# Patient Record
Sex: FEMALE | Race: WHITE | Employment: FULL TIME | URBAN - METROPOLITAN AREA
[De-identification: names, ages, dates, MRNs, and addresses within clinical notes are randomized per-mention and may not be internally consistent; named-entity substitution may affect disease eponyms.]

---

## 2020-12-16 PROBLEM — R60.0 BILATERAL EDEMA OF LOWER EXTREMITY: Status: ACTIVE | Noted: 2020-09-29

## 2020-12-16 PROBLEM — R91.8 PULMONARY NODULES: Status: ACTIVE | Noted: 2020-09-29

## 2020-12-16 PROBLEM — R09.02 HYPOXIA: Status: ACTIVE | Noted: 2020-12-16

## 2020-12-16 PROBLEM — F32.A DEPRESSION: Status: ACTIVE | Noted: 2020-12-16

## 2020-12-16 PROBLEM — N17.9 ACUTE KIDNEY INJURY (HCC): Status: ACTIVE | Noted: 2020-12-16

## 2020-12-16 PROBLEM — E66.01 OBESITY, MORBID (HCC): Status: ACTIVE | Noted: 2020-12-16

## 2020-12-16 PROBLEM — I50.31 ACUTE HEART FAILURE WITH PRESERVED EJECTION FRACTION (HCC): Status: ACTIVE | Noted: 2020-10-01

## 2020-12-16 PROBLEM — Z86.69 HISTORY OF SLEEP APNEA: Status: ACTIVE | Noted: 2020-12-16

## 2020-12-16 PROBLEM — R06.09 DOE (DYSPNEA ON EXERTION): Status: ACTIVE | Noted: 2020-09-29

## 2020-12-16 PROBLEM — S22.000A CLOSED COMPRESSION FRACTURE OF THORACIC VERTEBRA (HCC): Status: ACTIVE | Noted: 2018-05-10

## 2020-12-16 PROBLEM — J90 PLEURAL EFFUSION, BILATERAL: Status: ACTIVE | Noted: 2020-09-29

## 2021-02-26 ENCOUNTER — HOSPITAL ENCOUNTER (OUTPATIENT)
Dept: CT IMAGING | Age: 61
Discharge: HOME | End: 2021-02-26
Attending: NURSE PRACTITIONER
Payer: COMMERCIAL

## 2021-02-26 DIAGNOSIS — J90 PLEURAL EFFUSION, BILATERAL: ICD-10-CM

## 2021-02-26 DIAGNOSIS — R91.8 PULMONARY NODULES: ICD-10-CM

## 2021-02-26 DIAGNOSIS — R06.09 DOE (DYSPNEA ON EXERTION): ICD-10-CM

## 2021-02-26 PROCEDURE — 71250 CT THORAX DX C-: CPT

## 2021-03-04 PROBLEM — J90 PLEURAL EFFUSION, BILATERAL: Status: RESOLVED | Noted: 2020-09-29 | Resolved: 2021-03-04

## 2021-03-04 PROBLEM — R09.02 HYPOXIA: Status: RESOLVED | Noted: 2020-12-16 | Resolved: 2021-03-04

## 2021-03-16 PROBLEM — Z99.89 OSA ON CPAP: Status: ACTIVE | Noted: 2021-03-16

## 2021-03-16 PROBLEM — G47.33 OSA ON CPAP: Status: ACTIVE | Noted: 2021-03-16

## 2021-04-09 ENCOUNTER — HOSPITAL ENCOUNTER (OUTPATIENT)
Age: 61
Setting detail: OUTPATIENT SURGERY
Discharge: HOME | End: 2021-04-09
Attending: INTERNAL MEDICINE | Admitting: INTERNAL MEDICINE
Payer: COMMERCIAL

## 2021-04-09 ENCOUNTER — ANESTHESIA EVENT (OUTPATIENT)
Dept: ENDOSCOPY | Age: 61
End: 2021-04-09
Payer: COMMERCIAL

## 2021-04-09 ENCOUNTER — ANESTHESIA (OUTPATIENT)
Dept: ENDOSCOPY | Age: 61
End: 2021-04-09
Payer: COMMERCIAL

## 2021-04-09 VITALS
WEIGHT: 283 LBS | RESPIRATION RATE: 14 BRPM | HEART RATE: 70 BPM | TEMPERATURE: 98.6 F | HEIGHT: 64 IN | DIASTOLIC BLOOD PRESSURE: 66 MMHG | SYSTOLIC BLOOD PRESSURE: 122 MMHG | OXYGEN SATURATION: 97 % | BODY MASS INDEX: 48.32 KG/M2

## 2021-04-09 LAB
GLUCOSE BLD STRIP.AUTO-MCNC: 118 MG/DL (ref 65–100)
SERVICE CMNT-IMP: ABNORMAL

## 2021-04-09 PROCEDURE — 74011250636 HC RX REV CODE- 250/636: Performed by: ANESTHESIOLOGY

## 2021-04-09 PROCEDURE — 74011000250 HC RX REV CODE- 250: Performed by: NURSE ANESTHETIST, CERTIFIED REGISTERED

## 2021-04-09 PROCEDURE — 82962 GLUCOSE BLOOD TEST: CPT

## 2021-04-09 PROCEDURE — 76060000032 HC ANESTHESIA 0.5 TO 1 HR: Performed by: INTERNAL MEDICINE

## 2021-04-09 PROCEDURE — 76040000025: Performed by: INTERNAL MEDICINE

## 2021-04-09 PROCEDURE — 2709999900 HC NON-CHARGEABLE SUPPLY: Performed by: INTERNAL MEDICINE

## 2021-04-09 PROCEDURE — 74011250636 HC RX REV CODE- 250/636: Performed by: NURSE ANESTHETIST, CERTIFIED REGISTERED

## 2021-04-09 RX ORDER — SODIUM CHLORIDE, SODIUM LACTATE, POTASSIUM CHLORIDE, CALCIUM CHLORIDE 600; 310; 30; 20 MG/100ML; MG/100ML; MG/100ML; MG/100ML
100 INJECTION, SOLUTION INTRAVENOUS CONTINUOUS
Status: DISCONTINUED | OUTPATIENT
Start: 2021-04-09 | End: 2021-04-09 | Stop reason: HOSPADM

## 2021-04-09 RX ORDER — PROPOFOL 10 MG/ML
INJECTION, EMULSION INTRAVENOUS
Status: DISCONTINUED | OUTPATIENT
Start: 2021-04-09 | End: 2021-04-09 | Stop reason: HOSPADM

## 2021-04-09 RX ORDER — PROPOFOL 10 MG/ML
INJECTION, EMULSION INTRAVENOUS AS NEEDED
Status: DISCONTINUED | OUTPATIENT
Start: 2021-04-09 | End: 2021-04-09 | Stop reason: HOSPADM

## 2021-04-09 RX ORDER — LIDOCAINE HYDROCHLORIDE 20 MG/ML
INJECTION, SOLUTION EPIDURAL; INFILTRATION; INTRACAUDAL; PERINEURAL AS NEEDED
Status: DISCONTINUED | OUTPATIENT
Start: 2021-04-09 | End: 2021-04-09 | Stop reason: HOSPADM

## 2021-04-09 RX ADMIN — PROPOFOL 20 MG: 10 INJECTION, EMULSION INTRAVENOUS at 10:52

## 2021-04-09 RX ADMIN — SODIUM CHLORIDE, SODIUM LACTATE, POTASSIUM CHLORIDE, AND CALCIUM CHLORIDE 100 ML/HR: 600; 310; 30; 20 INJECTION, SOLUTION INTRAVENOUS at 08:49

## 2021-04-09 RX ADMIN — LIDOCAINE HYDROCHLORIDE 100 MG: 20 INJECTION, SOLUTION EPIDURAL; INFILTRATION; INTRACAUDAL; PERINEURAL at 10:50

## 2021-04-09 RX ADMIN — PROPOFOL 200 MCG/KG/MIN: 10 INJECTION, EMULSION INTRAVENOUS at 10:50

## 2021-04-09 RX ADMIN — PROPOFOL 60 MG: 10 INJECTION, EMULSION INTRAVENOUS at 10:50

## 2021-04-09 NOTE — ANESTHESIA PREPROCEDURE EVALUATION
Relevant Problems   No relevant active problems       Anesthetic History   No history of anesthetic complications            Review of Systems / Medical History  Patient summary reviewed and pertinent labs reviewed    Pulmonary        Sleep apnea: CPAP           Neuro/Psych         Psychiatric history    Comments: Neuropathy?  Cardiovascular    Hypertension          Hyperlipidemia    Exercise tolerance[de-identified] Activity limited by weight but she reports she remains active and denied chest pain, SOB, syncope   Comments: Chronic diastolic dysfunction     Nuclear stress 1/2021 - negative ischemia, normal LV function    GI/Hepatic/Renal     GERD      Liver disease (TAVERAS/fatty liver)     Endo/Other    Diabetes: well controlled  Hypothyroidism: well controlled  Morbid obesity and arthritis     Other Findings              Physical Exam    Airway  Mallampati: II  TM Distance: 4 - 6 cm  Neck ROM: normal range of motion   Mouth opening: Normal     Cardiovascular    Rhythm: regular  Rate: normal      Pertinent negatives: No JVD and peripheral edema   Dental    Dentition: Poor dentition, Lower partial plate and Upper partial plate     Pulmonary                Comments: Distant but no appreciable wheezing or rales Abdominal         Other Findings            Anesthetic Plan    ASA: 3  Anesthesia type: total IV anesthesia          Induction: Intravenous  Anesthetic plan and risks discussed with: Patient and Spouse

## 2021-04-09 NOTE — DISCHARGE INSTRUCTIONS
Gastrointestinal Esophagogastroduodenoscopy (EGD) - Upper Exam Discharge Instructions    1. Call Dr. Ora Saleh at [phone] for any problems or questions. 2. Contact the doctor's office for follow up appointment as directed. 3. Medication may cause drowsiness for several hours, therefore:  · Do not drive or operate machinery for remainder of the day. · No alcohol today. · Do not make any important or legal decisions for 24 hours. · Do not sign any legal documents for 24 hours. 5. Ordinarily, you may resume regular diet and activity after exam unless otherwise specified by your physician. 6. For mild soreness in your throat you may use Cepacol throat lozenges or warm  salt-water gargles as needed. Any additional instructions:  Soft diet today and increase as tolerated, continue pantoprozole,      Instructions given to Danuta Warren and other family members. Instructions given by:      Gastrointestinal Colonoscopy/Flexible Sigmoidoscopy - Lower Exam Discharge Instructions  1. Call Dr. palma  for any problems or questions. 2. Contact the doctors office for follow up appointment as directed  3. Medication may cause drowsiness for several hours, therefore:  · Do not drive or operate machinery for reminder of the day. · No alcohol today. · Do not make any important or legal decisions for 24 hours. · Do not sign any legal documents for 24 hours. 4. Ordinarily, you may resume regular diet and activity after exam unless otherwise specified by your physician. 5. Because of air put into your colon during exam, you may experience some abdominal distension, relieved by the passage of gas, for several hours. 6. Contact your physician if you have any of the following:  · Excessive amount of bleeding - large amount when having a bowel movement. Occasional specks of blood with bowel movement would not be unusual.  · Severe abdominal pain  · Fever or Chills  7.  Polyp Removal - follow these additional instructions  · No Aspirin, Advil, Aleve, Nuprin, Ibuprofen, or medications that contain these drugs for 2 weeks. Any additional instructions:  Repeat colon in 3 years, high fiber diet. Instructions given to Sadia Becerril and other family members.   Instructions given by:

## 2021-04-09 NOTE — H&P
Gastroenterology Outpatient History and Physical    Patient: Yuli Gustafson    Physician: Tunde Granger MD    Vital Signs: Blood pressure (!) 165/77, pulse 89, temperature 97.9 °F (36.6 °C), resp. rate 16, height 5' 3.5\" (1.613 m), weight 128.4 kg (283 lb), SpO2 100 %. Allergies: Allergies   Allergen Reactions    Dapagliflozin Other (comments)     Yeast infection    Erythromycin Nausea and Vomiting     Severe GI upset    Lisinopril Other (comments)     Heaviness in extremities, cough      Statins-Hmg-Coa Reductase Inhibitors Other (comments)     Heaviness in extremities       Chief Complaint: Dysphagia, history of esophageal stricture, gastroesophageal reflux disease, sauer with fibrosis, history of colon polyps due for followup. History of Present Illness: No changes since office visit of 2/16/21. Justification for Procedure: R/o inflammatory, neoplastic or other pathology in UGI and LGI tract.     History:  Past Medical History:   Diagnosis Date    SALINA [facility]acute kidney injury) ([facility] [address] 11/2020    Arthritis     back    Congestive heart failure ([facility] [address] 09/2020    Diabetes mellitus (HCC)     insulin, -110, S&s hypoglycemia BS 60's, 10/2020 A1c 7.6    GERD (gastroesophageal reflux disease)     medication    Hashimoto's disease     Hypertension     controlled with meds    Liver disease     SAUER (nonalcoholic steatohepatitis)     Psychiatric disorder     anxiety and depression    Sleep apnea 02/2021    cpap at     Thyroid disease       Past Surgical History:   Procedure Laterality Date    HX CHOLECYSTECTOMY      HX HYSTERECTOMY      HX ORTHOPAEDIC Right 2017    x 4 surgeries on ankles    HX ORTHOPAEDIC Left 2020    x 2 surgeries on ankles    HX REFRACTIVE SURGERY      HX TONSILLECTOMY        Social History     Socioeconomic History    Marital status:      Spouse name: Not on file    Number of children: Not on file    Years of education: Not on file    Highest education level: Not on file   Tobacco Use    Smoking status: Never Smoker    Smokeless tobacco: Never Used   Substance and Sexual Activity    Alcohol use: Not Currently    Drug use: Not Currently      Family History   Problem Relation Age of Onset   [facility] Hypertension Mother     Other Mother         ALS    COPD Father     Heart Disease Father     Asthma Sister     Cancer Sister     Diabetes Sister        Medications:   Prior to Admission medications    Medication Sig Start Date End Date Taking? Authorizing Provider   insulin glargine (LANTUS,BASAGLAR) 100 unit/mL (3 mL) inpn by SubCUTAneous route two (2) times a day. Divided dose 55 am  And 60 at pm   Yes Provider, Historical   pantoprazole (PROTONIX) 40 mg tablet Take 40 mg by mouth daily. Yes Provider, Historical   amLODIPine (NORVASC) 10 mg tablet Take  by mouth every evening. Provider, Historical   ergocalciferol (ERGOCALCIFEROL) 1,250 mcg (50,000 unit) capsule Take 50,000 Units by mouth every Tuesday and Friday. Tuesday and Friday    Provider, Historical   diclofenac EC (VOLTAREN) 50 mg EC tablet Take  by mouth two (2) times a day. Provider, Historical   cyclobenzaprine (FLEXERIL) 10 mg tablet Take  by mouth three (3) times daily as needed for Muscle Spasm(s). Provider, Historical   acetaminophen (TylenoL) 325 mg tablet Take 650 mg by mouth every four (4) hours as needed for Pain. Provider, Historical   losartan (COZAAR) 25 mg tablet Take 25 mg by mouth daily. Provider, Historical   furosemide (Lasix) 40 mg tablet Take 40 mg by mouth daily. Takes BID if >2 lb wt gain    Provider, Historical   aspirin 81 mg chewable tablet Take 81 mg by mouth daily. preventative    Provider, Historical   insulin aspart U-100 (NOVOLOG) 100 unit/mL (3 mL) inpn 1 Units by SubCUTAneous route Before breakfast, lunch, and dinner. Sliding scale dosing:     Provider, Historical   levothyroxine (SYNTHROID) 75 mcg tablet Take  by mouth Daily (before breakfast). Provider, Historical   albuterol (PROVENTIL HFA, VENTOLIN HFA, PROAIR HFA) 90 mcg/actuation inhaler Take 2 Puffs by inhalation every four (4) hours as needed for Wheezing. 3/4/21   Mary Mccarty MD   spironolactone (ALDACTONE) 25 mg tablet Take  by mouth daily. Provider, Historical   gabapentin (NEURONTIN) 300 mg capsule Take 300 mg by mouth nightly. And prn up to TID    Provider, Historical   HYDROcodone-acetaminophen (NORCO) 5-325 mg per tablet Take  by mouth every six (6) hours as needed. Provider, Historical   dicyclomine (BENTYL) 10 mg capsule Take 10 mg by mouth as needed. Provider, Historical   ondansetron hcl (Zofran) 8 mg tablet Take 8 mg by mouth every eight (8) hours as needed for Nausea or Vomiting. Provider, Historical   plecanatide (Trulance) 3 mg tab Take 0.75-3 mg by mouth daily as needed. Provider, Historical       Physical Exam:   General: alert, cooperative, no distress   HEENT: Head: Normocephalic, no lesions, without obvious abnormality. Heart: regular rate and rhythm, S1, S2 normal, no murmur, click, rub or gallop   Lungs: chest clear, no wheezing, rales, normal symmetric air entry   Abdominal: Bowel sounds are normal, liver is not enlarged, spleen is not enlarged   Neurological: Grossly normal   Extremities: no edema     Findings/Diagnosis:  Dysphagia, history of esophageal stricture, gastroesophageal reflux disease, sauer with fibrosis, history of colon polyps due for followup    Plan of Care/Planned Procedure: EGD, Dilation and Colonoscopy.     Leora Costello MD

## 2021-04-10 NOTE — PROCEDURES
Operative Note    Patient: Bridgette Montes De Oca  YOB: 1960  MRN: [id number]    Date of Procedure: 4/9/2021     Pre-Op Diagnosis: GERD without esophagitis [K21.9]  Dysphagia, unspecified type [R13.10]  History of esophageal stricture   Hepatic fibrosis [K74.00]  TAVERAS (nonalcoholic steatohepatitis) [K75.81]  Colon screening  Family history of colon cancer [Z80.0]  Hx of colonic polyps [Z86.010]  Chronic idiopathic constipation [K59.04]    Post-Op Diagnosis: Esophageal stricture at EG junction which was dilated to 56-[place]. Mild diverticulosis and small internal hemorrhoids. Procedure(s):  ESOPHAGOGASTRODUODENOSCOPY (EGD)  COLONOSCOPY/ 50  ESOPHAGEAL DILATION    Surgeon(s):  Vilma Fuentes MD    Surgical Assistant: None    Anesthesia: MAC     Estimated Blood Loss (mL): Minimal    Complications: None    Specimens: * No specimens in log *     Implants: * No implants in log *    Procedure #1:  After obtaining informed consent, the patient was placed in the left lateral position and received IV anesthesia. See anesthesia records for details. The endoscope was advanced under direct vision without difficulty. The esophagus, stomach (including retroflexed views) and duodenum were evaluated. The patient was then repositioned for the Colonoscopy. COMMENTS:  54 and 56 Fr Power dilators were passed sequentially in the usual fashion without difficulty after the initial endoscopy. Mild resistance and small amount of blood was noted on the larger dilator. Repeat endoscopy after dilation revealed minimal trauma with the smaller dilator and mild superficial tear indicating successful dilation with the larger dilator.  There was mild bleeding with spontaneous hemostasis confirmed before withdrawal.     Findings:  OROPHARYNX:  Cords and Pyriform recesses appeared normal.  ESOPHAGUS:  The proximal, mid and distal esophagus appeared normal. The Z line was intact without evidence of esophagitis, ulcerations or Encinas's at 38 cms. A nonobstructing ring like narrowing at the EG junction was noted which was dilated as described above. STOMACH:  The fundus on antegrade and retroflexed views was normal. The body, antrum and pylorus also appeared normal.  DUODENUM:  The bulb and second portions appeared normal.    Recommendations:   Routine post endoscopy + dilation instructions. Soft diet today - to be advanced as tolerated. Continue pantoprazole and other antireflux measures. Consider larger bougie dilation to 60 Fr if symptoms persist or recur early. Also see colonoscopy report. Procedure #2:  After informed consent for both procedures, the patient was placed in the left lateral position and received iv anesthesia. See anesthesiology records for details. EGD was initially performed as described. Patient was re-positioned for Colonoscopy. A digital rectal exam was performed. The Colonoscope was inserted into the rectum and passed under direct vison to the cecum where the ileocecal valve and appendiceal orifice were identified. Colonoscope was then slowly withdrawn with careful evaluation of the mucosa. Retroflexion was performed in the rectum. Prep was fair / good. Visualization was adequate. The patient was taken to the recovery area in stable condition. Findings:  RECTUM: Small internal hemorrhoids were noted. COLON: Mild diverticulosis was noted in the Sigmoid and Descending Colon. Remainder of the Colon exam was within normal limits. TERMINAL ILEUM: The terminal ileum was not entered. Recommendations:   Routine post colonoscopy instructions. High fiber diet. Conservative management of diverticulosis and hemorrhoids. Surveillance colonoscopy based on pathology of polyps removed or in 3 years secondary to prior history with removal of multiple high-risk adenomas in 2019. Also see EGD report.    Findings and recommendations were reviewed with patient and attendant in recovery area after the procedure.        Electronically Signed by Bravo Obando MD on 4/9/2021 at 10:41 PM

## 2021-05-11 ENCOUNTER — HOSPITAL ENCOUNTER (OUTPATIENT)
Dept: CT IMAGING | Age: 61
Discharge: HOME | End: 2021-05-11
Attending: STUDENT IN AN ORGANIZED HEALTH CARE EDUCATION/TRAINING PROGRAM
Payer: COMMERCIAL

## 2021-05-11 DIAGNOSIS — J32.9 CHRONIC SINUSITIS, UNSPECIFIED LOCATION: ICD-10-CM

## 2021-05-11 PROCEDURE — 70486 CT MAXILLOFACIAL W/O DYE: CPT

## 2021-06-25 PROBLEM — R91.8 PULMONARY NODULES: Status: RESOLVED | Noted: 2020-09-29 | Resolved: 2021-06-25

## 2021-09-16 PROBLEM — Z78.9 DIFFICULTY WITH CPAP NASAL MASK USE: Status: ACTIVE | Noted: 2021-09-16

## 2021-09-16 PROBLEM — Z86.69 HISTORY OF SLEEP APNEA: Status: RESOLVED | Noted: 2020-12-16 | Resolved: 2021-09-16

## 2022-03-18 PROBLEM — N17.9 ACUTE KIDNEY INJURY (HCC): Status: ACTIVE | Noted: 2020-12-16

## 2022-03-18 PROBLEM — S22.000A CLOSED COMPRESSION FRACTURE OF THORACIC VERTEBRA (HCC): Status: ACTIVE | Noted: 2018-05-10

## 2022-03-19 PROBLEM — G47.33 OSA ON CPAP: Status: ACTIVE | Noted: 2021-03-16

## 2022-03-19 PROBLEM — Z78.9 DIFFICULTY WITH CPAP NASAL MASK USE: Status: ACTIVE | Noted: 2021-09-16

## 2022-03-19 PROBLEM — R60.0 BILATERAL EDEMA OF LOWER EXTREMITY: Status: ACTIVE | Noted: 2020-09-29

## 2022-03-19 PROBLEM — R06.09 DOE (DYSPNEA ON EXERTION): Status: ACTIVE | Noted: 2020-09-29

## 2022-03-19 PROBLEM — I50.31 ACUTE HEART FAILURE WITH PRESERVED EJECTION FRACTION (HCC): Status: ACTIVE | Noted: 2020-10-01

## 2022-03-19 PROBLEM — E66.01 OBESITY, MORBID (HCC): Status: ACTIVE | Noted: 2020-12-16

## 2022-03-19 PROBLEM — Z99.89 OSA ON CPAP: Status: ACTIVE | Noted: 2021-03-16

## 2022-03-20 PROBLEM — F32.A DEPRESSION: Status: ACTIVE | Noted: 2020-12-16

## 2022-05-14 ENCOUNTER — HOSPITAL ENCOUNTER (EMERGENCY)
Age: 62
Discharge: HOME | End: 2022-05-14
Attending: EMERGENCY MEDICINE
Payer: COMMERCIAL

## 2022-05-14 ENCOUNTER — APPOINTMENT (OUTPATIENT)
Dept: CT IMAGING | Age: 62
End: 2022-05-14
Attending: EMERGENCY MEDICINE
Payer: COMMERCIAL

## 2022-05-14 VITALS
TEMPERATURE: 98.7 F | OXYGEN SATURATION: 97 % | RESPIRATION RATE: 17 BRPM | SYSTOLIC BLOOD PRESSURE: 149 MMHG | WEIGHT: 282 LBS | HEIGHT: 64 IN | DIASTOLIC BLOOD PRESSURE: 77 MMHG | HEART RATE: 75 BPM | BODY MASS INDEX: 48.14 KG/M2

## 2022-05-14 DIAGNOSIS — R10.9 ACUTE LEFT FLANK PAIN: Primary | ICD-10-CM

## 2022-05-14 LAB
ALBUMIN SERPL-MCNC: 3.4 G/DL (ref 3.2–4.6)
ALBUMIN/GLOB SERPL: 0.7 {RATIO} (ref 1.2–3.5)
ALP SERPL-CCNC: 73 U/L (ref 50–130)
ALT SERPL-CCNC: 59 U/L (ref 12–65)
ANION GAP SERPL CALC-SCNC: 5 MMOL/L (ref 7–16)
AST SERPL-CCNC: 69 U/L (ref 15–37)
BASOPHILS # BLD: 0.1 K/UL (ref 0–0.2)
BASOPHILS NFR BLD: 1 % (ref 0–2)
BILIRUB SERPL-MCNC: 0.4 MG/DL (ref 0.2–1.1)
BUN SERPL-MCNC: 24 MG/DL (ref 8–23)
CALCIUM SERPL-MCNC: 10.1 MG/DL (ref 8.3–10.4)
CHLORIDE SERPL-SCNC: 104 MMOL/L (ref 98–107)
CO2 SERPL-SCNC: 31 MMOL/L (ref 21–32)
CREAT SERPL-MCNC: 1.44 MG/DL (ref 0.6–1)
DIFFERENTIAL METHOD BLD: NORMAL
EOSINOPHIL # BLD: 0.2 K/UL (ref 0–0.8)
EOSINOPHIL NFR BLD: 2 % (ref 0.5–7.8)
ERYTHROCYTE [DISTWIDTH] IN BLOOD BY AUTOMATED COUNT: 13.7 % (ref 11.9–14.6)
GLOBULIN SER CALC-MCNC: 5.1 G/DL (ref 2.3–3.5)
GLUCOSE SERPL-MCNC: 141 MG/DL (ref 65–100)
HCT VFR BLD AUTO: 44.4 % (ref 35.8–46.3)
HGB BLD-MCNC: 14.2 G/DL (ref 11.7–15.4)
IMM GRANULOCYTES # BLD AUTO: 0 K/UL (ref 0–0.5)
IMM GRANULOCYTES NFR BLD AUTO: 0 % (ref 0–5)
LYMPHOCYTES # BLD: 2.5 K/UL (ref 0.5–4.6)
LYMPHOCYTES NFR BLD: 34 % (ref 13–44)
MCH RBC QN AUTO: 29 PG (ref 26.1–32.9)
MCHC RBC AUTO-ENTMCNC: 32 G/DL (ref 31.4–35)
MCV RBC AUTO: 90.8 FL (ref 79.6–97.8)
MONOCYTES # BLD: 0.6 K/UL (ref 0.1–1.3)
MONOCYTES NFR BLD: 8 % (ref 4–12)
NEUTS SEG # BLD: 4.1 K/UL (ref 1.7–8.2)
NEUTS SEG NFR BLD: 55 % (ref 43–78)
NRBC # BLD: 0 K/UL (ref 0–0.2)
PLATELET # BLD AUTO: 206 K/UL (ref 150–450)
PMV BLD AUTO: 11.7 FL (ref 9.4–12.3)
POTASSIUM SERPL-SCNC: 5.7 MMOL/L (ref 3.5–5.1)
PROT SERPL-MCNC: 8.5 G/DL (ref 6.3–8.2)
RBC # BLD AUTO: 4.89 M/UL (ref 4.05–5.2)
SODIUM SERPL-SCNC: 140 MMOL/L (ref 136–145)
WBC # BLD AUTO: 7.4 K/UL (ref 4.3–11.1)

## 2022-05-14 PROCEDURE — 99284 EMERGENCY DEPT VISIT MOD MDM: CPT

## 2022-05-14 PROCEDURE — 81003 URINALYSIS AUTO W/O SCOPE: CPT

## 2022-05-14 PROCEDURE — 74176 CT ABD & PELVIS W/O CONTRAST: CPT

## 2022-05-14 PROCEDURE — 85025 COMPLETE CBC W/AUTO DIFF WBC: CPT

## 2022-05-14 PROCEDURE — 80053 COMPREHEN METABOLIC PANEL: CPT

## 2022-05-14 RX ORDER — TIZANIDINE 2 MG/1
8 TABLET ORAL
Status: DISCONTINUED | OUTPATIENT
Start: 2022-05-14 | End: 2022-05-14 | Stop reason: HOSPADM

## 2022-05-14 RX ORDER — TIZANIDINE 4 MG/1
4-8 TABLET ORAL 3 TIMES DAILY
Qty: 24 TABLET | Refills: 0 | Status: SHIPPED | OUTPATIENT
Start: 2022-05-14

## 2022-05-14 NOTE — ED NOTES
I have reviewed discharge instructions with the patient. The patient verbalized understanding. Patient left ED via Discharge Method: ambulatory to Home with spouse. Opportunity for questions and clarification provided. Patient given 1 scripts. To continue your aftercare when you leave the hospital, you may receive an automated call from our care team to check in on how you are doing. This is a free service and part of our promise to provide the best care and service to meet your aftercare needs.  If you have questions, or wish to unsubscribe from this service please call [phone]. Thank you for Choosing our [facility] Emergency Department.

## 2022-05-14 NOTE — ED PROVIDER NOTES
70-year-old female has a history of diabetes, sleep apnea, perhaps congestive heart failure. She has had some back issues and goes to pain management as well. She has had a 48-hour history of left flank pain which she described as being punched in the back. She has had nausea. Describes as ache. Slightly worse with movement. Slightly worse with breathing but no cough chest pain or shortness of breath. No fever chills. No definite urinary symptoms. No radiation of the pain down her legs or to the abdomen. Remote history of kidney stones. Seen in urgent care and referred here. Review of urgent care records reveals urine without evidence for blood nor infection. She has had a history of a hysterectomy and cholecystectomy as well. Pain is always there for the last 2 days, somewhat waxing and waning. Review of records also reveals some issues with some mildly elevated creatinine in the past.    The history is provided by the patient. Back Pain   This is a new problem. The problem has not changed since onset. The problem occurs constantly. The pain is associated with no known injury. The pain is present in the middle back, lower back and left side. The quality of the pain is described as aching and dull. The pain does not radiate. The pain is moderate. Pertinent negatives include no chest pain, no fever, no abdominal pain, no dysuria, no leg pain, no paresthesias and no weakness. She has tried analgesics for the symptoms. Nausea   Pertinent negatives include no chills, no fever, no abdominal pain and no cough.         Past Medical History:   Diagnosis Date    SALINA (acute kidney injury) ([facility] [address] 11/2020    Arthritis     back    Congestive heart failure ([facility] [address] 09/2020    Diabetes mellitus (HCC)     insulin, -110, S&s hypoglycemia BS 60's, 10/2020 A1c 7.6    GERD (gastroesophageal reflux disease)     medication    Hashimoto's disease     Hypertension     controlled with meds    Liver disease     TAVERAS (nonalcoholic steatohepatitis)     Psychiatric disorder     anxiety and depression    Sleep apnea 02/2021    cpap at hs    Thyroid disease        Past Surgical History:   Procedure Laterality Date    COLONOSCOPY N/A 4/9/2021    COLONOSCOPY/ 48 performed by Jessica Dockery MD at [facility] ENDOSCOPY    HX CHOLECYSTECTOMY      HX HYSTERECTOMY      HX ORTHOPAEDIC Right 2017    x 4 surgeries on ankles    HX ORTHOPAEDIC Left 2020    x 2 surgeries on ankles    HX REFRACTIVE SURGERY      HX TONSILLECTOMY           Family History:   Problem Relation Age of Onset    Hypertension Mother     Other Mother         ALS    COPD Father     Heart Disease Father     Asthma Sister     Cancer Sister     Diabetes Sister        Social History     Socioeconomic History    Marital status:      Spouse name: Not on file    Number of children: Not on file    Years of education: Not on file    Highest education level: Not on file   Occupational History    Not on file   Tobacco Use    Smoking status: Never Smoker    Smokeless tobacco: Never Used   Vaping Use    Vaping Use: Never used   Substance and Sexual Activity    Alcohol use: Not Currently    Drug use: Not Currently    Sexual activity: Not on file   Other Topics Concern    Not on file   Social History Narrative    Not on file     Social Determinants of Health     Financial Resource Strain:     Difficulty of Paying Living Expenses: Not on file   Food Insecurity:     Worried About Running Out of Food in the Last Year: Not on file    Dianelys of Food in the Last Year: Not on file   Transportation Needs:     Lack of Transportation (Medical): Not on file    Lack of Transportation (Non-Medical):  Not on file   Physical Activity:     Days of Exercise per Week: Not on file    Minutes of Exercise per Session: Not on file   Stress:     Feeling of Stress : Not on file   Social Connections:     Frequency of Communication with Friends and Family: Not on file  Frequency of Social Gatherings with Friends and Family: Not on file    Attends [employer] Services: Not on file    Active Member of Clubs or Organizations: Not on file    Attends Club or Organization Meetings: Not on file    Marital Status: Not on file   Intimate Partner Violence:     Fear of Current or Ex-Partner: Not on file    Emotionally Abused: Not on file    Physically Abused: Not on file    Sexually Abused: Not on file   Housing Stability:     Unable to Pay for Housing in the Last Year: Not on file    Number of [place] in the Last Year: Not on file    Unstable Housing in the Last Year: Not on file         ALLERGIES: Adhesive, Statins-hmg-coa reductase inhibitors, Dapagliflozin, Dulaglutide, Erythromycin, Ezetimibe, Liraglutide, Lisinopril, and Metformin hcl    Review of Systems   Constitutional: Negative for chills and fever. Respiratory: Negative for cough and shortness of breath. Cardiovascular: Negative for chest pain. Gastrointestinal: Positive for nausea. Negative for abdominal pain and blood in stool. Genitourinary: Positive for flank pain. Negative for difficulty urinating, dysuria and hematuria. Musculoskeletal: Positive for back pain. Neurological: Negative for weakness and paresthesias. All other systems reviewed and are negative. Vitals:    05/14/22 1352   BP: (!) 149/77   Pulse: 75   Resp: 17   Temp: 98.7 °F (37.1 °C)   SpO2: 97%   Weight: 127.9 kg (282 lb)   Height: 5' 4\" (1.626 m)            Physical Exam  Vitals and nursing note reviewed. Constitutional:       Appearance: She is not ill-appearing. Eyes:      General: No scleral icterus. Conjunctiva/sclera: Conjunctivae normal.   Cardiovascular:      Rate and Rhythm: Normal rate and regular rhythm. Pulmonary:      Effort: Pulmonary effort is normal.      Breath sounds: Normal breath sounds. Abdominal:      Tenderness: There is left CVA tenderness. Comments: No anterior abdominal tenderness. Skin:     General: Skin is warm and dry. Comments: No vesicular rash in back. Some slight discoloration of both flanks. Neurological:      General: No focal deficit present. Mental Status: She is alert. MDM  Number of Diagnoses or Management Options  Diagnosis management comments: Discoloration of the back is chronic according to . Due to heating pad use by the patient. Recheck urinalysis and screening blood work. CT scan to assess for ureteral stone. Possibility of worsening of chronic back pain as well. Doubt any cardiopulmonary issues       Amount and/or Complexity of Data Reviewed  Clinical lab tests: ordered and reviewed  Tests in the radiology section of CPT®: ordered and reviewed    Risk of Complications, Morbidity, and/or Mortality  Presenting problems: moderate  Diagnostic procedures: low  Management options: low    Patient Progress  Patient progress: stable         Procedures      Results Include:    Recent Results (from the past 24 hour(s))   CBC WITH AUTOMATED DIFF    Collection Time: 05/14/22  2:14 PM   Result Value Ref Range    WBC 7.4 4.3 - 11.1 K/uL    RBC 4.89 4.05 - 5.2 M/uL    HGB 14.2 11.7 - 15.4 g/dL    HCT 44.4 35.8 - 46.3 %    MCV 90.8 79.6 - 97.8 FL    MCH 29.0 26.1 - 32.9 PG    MCHC 32.0 31.4 - 35.0 g/dL    RDW 13.7 11.9 - 14.6 %    PLATELET 593 610 - 789 K/uL    MPV 11.7 9.4 - 12.3 FL    ABSOLUTE NRBC 0.00 0.0 - 0.2 K/uL    DF AUTOMATED      NEUTROPHILS 55 43 - 78 %    LYMPHOCYTES 34 13 - 44 %    MONOCYTES 8 4.0 - 12.0 %    EOSINOPHILS 2 0.5 - 7.8 %    BASOPHILS 1 0.0 - 2.0 %    IMMATURE GRANULOCYTES 0 0.0 - 5.0 %    ABS. NEUTROPHILS 4.1 1.7 - 8.2 K/UL    ABS. LYMPHOCYTES 2.5 0.5 - 4.6 K/UL    ABS. MONOCYTES 0.6 0.1 - 1.3 K/UL    ABS. EOSINOPHILS 0.2 0.0 - 0.8 K/UL    ABS. BASOPHILS 0.1 0.0 - 0.2 K/UL    ABS. IMM.  GRANS. 0.0 0.0 - 0.5 K/UL   METABOLIC PANEL, COMPREHENSIVE    Collection Time: 05/14/22  2:14 PM   Result Value Ref Range    Sodium 140 136 - 145 mmol/L    Potassium 5.7 (H) 3.5 - 5.1 mmol/L    Chloride 104 98 - 107 mmol/L    CO2 31 21 - 32 mmol/L    Anion gap 5 (L) 7 - 16 mmol/L    Glucose 141 (H) 65 - 100 mg/dL    BUN 24 (H) 8 - 23 MG/DL    Creatinine 1.44 (H) 0.6 - 1.0 MG/DL    GFR est AA 48 (L) >60 ml/min/1.73m2    GFR est non-AA 39 (L) >60 ml/min/1.73m2    Calcium 10.1 8.3 - 10.4 MG/DL    Bilirubin, total 0.4 0.2 - 1.1 MG/DL    ALT (SGPT) 59 12 - 65 U/L    AST (SGOT) 69 (H) 15 - 37 U/L    Alk. phosphatase 73 50 - 130 U/L    Protein, total 8.5 (H) 6.3 - 8.2 g/dL    Albumin 3.4 3.2 - 4.6 g/dL    Globulin 5.1 (H) 2.3 - 3.5 g/dL    A-G Ratio 0.7 (L) 1.2 - 3.5       .[facility]  CT ABD/PEL FOR RENAL STONE    Result Date: 5/14/2022  CT OF THE ABDOMEN AND PELVIS WITHOUT CONTRAST STONE PROTOCOL INDICATION: Left flank pain. Remote history of kidney stones. COMPARISON: None. TECHNIQUE: Multiple axial images were obtained through the abdomen and pelvis without IV contrast. Radiation dose reduction techniques were used for this study. Our CT scanners use one or all of the following: Automated exposure control, adjustment of the mA and/or kV according to patient size, iterative reconstruction. FINDINGS: Visualized thorax: Normal. Liver: Macronodular appearance of the liver suggesting chronic liver disease. Gallbladder/biliary: Gallbladder surgically absent. No biliary dilatation. Pancreas: Normal. Spleen: Normal. Adrenals: Normal. Kidneys: No radiopaque stone or hydronephrosis. Bladder: Normal. Pelvic organs: Status post hysterectomy. No adnexal mass. Gastrointestinal: Normal. Peritoneum/retroperitoneum: Normal. Lymph nodes: Normal. Vessels: Normal. Bones/Soft tissues: Normal.     1.  No urolithiasis or hydronephrosis. No visualized etiology for flank pain. 2.  Liver morphology suggestive of chronic liver disease. Symptom control and follow-up with primary care doctor and pain management.

## 2022-05-14 NOTE — ED TRIAGE NOTES
Pt c/o left lower back pain that started yesterday along with nausea. Pt states she went to urgent care and was told to come to the ED. Pt states she had a kidney stone when she was 13. Pt denies urinary pain, pt states there is an odor to her urine.

## 2022-05-14 NOTE — DISCHARGE INSTRUCTIONS
Continue rest and heat. Continue current medications. If Zanaflex is helpful, may try that. Consider rechecking with pain management to see if needed any further therapies. Recheck sooner for rash fever or other concerns.

## 2022-06-07 ENCOUNTER — HOSPITAL ENCOUNTER (OUTPATIENT)
Dept: SURGERY | Age: 62
Discharge: HOME | End: 2022-06-10
Payer: COMMERCIAL

## 2022-06-07 ENCOUNTER — HOSPITAL ENCOUNTER (OUTPATIENT)
Dept: REHABILITATION | Age: 62
Discharge: HOME | End: 2022-06-10
Payer: COMMERCIAL

## 2022-06-07 VITALS
HEART RATE: 73 BPM | SYSTOLIC BLOOD PRESSURE: 155 MMHG | DIASTOLIC BLOOD PRESSURE: 75 MMHG | RESPIRATION RATE: 18 BRPM | BODY MASS INDEX: 48.32 KG/M2 | WEIGHT: 283 LBS | HEIGHT: 64 IN | TEMPERATURE: 97.6 F | OXYGEN SATURATION: 96 %

## 2022-06-07 LAB
ANION GAP SERPL CALC-SCNC: 1 MMOL/L (ref 7–16)
APTT PPP: 25.2 SEC (ref 24.1–35.1)
BUN SERPL-MCNC: 20 MG/DL (ref 8–23)
CALCIUM SERPL-MCNC: 10.3 MG/DL (ref 8.3–10.4)
CHLORIDE SERPL-SCNC: 104 MMOL/L (ref 98–107)
CO2 SERPL-SCNC: 33 MMOL/L (ref 21–32)
CREAT SERPL-MCNC: 1.08 MG/DL (ref 0.6–1)
EKG ATRIAL RATE: 68 BPM
EKG DIAGNOSIS: NORMAL
EKG P AXIS: 38 DEGREES
EKG P-R INTERVAL: 156 MS
EKG Q-T INTERVAL: 458 MS
EKG QRS DURATION: 142 MS
EKG QTC CALCULATION (BAZETT): 487 MS
EKG R AXIS: 44 DEGREES
EKG T AXIS: 26 DEGREES
EKG VENTRICULAR RATE: 68 BPM
ERYTHROCYTE [DISTWIDTH] IN BLOOD BY AUTOMATED COUNT: 13.7 % (ref 11.9–14.6)
GLUCOSE SERPL-MCNC: 102 MG/DL (ref 65–100)
HCT VFR BLD AUTO: 43.6 % (ref 35.8–46.3)
HGB BLD-MCNC: 13.8 G/DL (ref 11.7–15.4)
INR PPP: 1
MCH RBC QN AUTO: 28.8 PG (ref 26.1–32.9)
MCHC RBC AUTO-ENTMCNC: 31.7 G/DL (ref 31.4–35)
MCV RBC AUTO: 90.8 FL (ref 79.6–97.8)
NRBC # BLD: 0 K/UL (ref 0–0.2)
PLATELET # BLD AUTO: 201 K/UL (ref 150–450)
PMV BLD AUTO: 11.8 FL (ref 9.4–12.3)
POTASSIUM SERPL-SCNC: 3.9 MMOL/L (ref 3.5–5.1)
PROTHROMBIN TIME: 13.6 SEC (ref 12.6–14.5)
RBC # BLD AUTO: 4.8 M/UL (ref 4.05–5.2)
SODIUM SERPL-SCNC: 138 MMOL/L (ref 136–145)
WBC # BLD AUTO: 6.9 K/UL (ref 4.3–11.1)

## 2022-06-07 PROCEDURE — 93005 ELECTROCARDIOGRAM TRACING: CPT | Performed by: ANESTHESIOLOGY

## 2022-06-07 PROCEDURE — 97161 PT EVAL LOW COMPLEX 20 MIN: CPT

## 2022-06-07 PROCEDURE — 98960 EDU&TRN PT SELF-MGMT NQHP 1: CPT

## 2022-06-07 PROCEDURE — 85610 PROTHROMBIN TIME: CPT

## 2022-06-07 PROCEDURE — 85027 COMPLETE CBC AUTOMATED: CPT

## 2022-06-07 PROCEDURE — 85730 THROMBOPLASTIN TIME PARTIAL: CPT

## 2022-06-07 PROCEDURE — 80048 BASIC METABOLIC PNL TOTAL CA: CPT

## 2022-06-07 RX ORDER — TRAMADOL HYDROCHLORIDE 50 MG/1
50 TABLET ORAL EVERY 6 HOURS PRN
Status: ON HOLD | COMMUNITY
End: 2022-06-17 | Stop reason: HOSPADM

## 2022-06-07 RX ORDER — KETOROLAC TROMETHAMINE 10 MG/1
10 TABLET, FILM COATED ORAL EVERY 6 HOURS PRN
Status: ON HOLD | COMMUNITY
End: 2022-06-17 | Stop reason: HOSPADM

## 2022-06-07 RX ORDER — LEVOTHYROXINE SODIUM 0.1 MG/1
100 TABLET ORAL DAILY
COMMUNITY

## 2022-06-07 RX ORDER — METHOCARBAMOL 750 MG/1
750 TABLET, FILM COATED ORAL 4 TIMES DAILY
Status: ON HOLD | COMMUNITY
End: 2022-06-17 | Stop reason: HOSPADM

## 2022-06-07 RX ORDER — LANOLIN ALCOHOL/MO/W.PET/CERES
1000 CREAM (GRAM) TOPICAL DAILY
COMMUNITY

## 2022-06-07 RX ORDER — INSULIN GLARGINE 300 U/ML
80 INJECTION, SOLUTION SUBCUTANEOUS 2 TIMES DAILY
COMMUNITY

## 2022-06-07 RX ORDER — GLIPIZIDE 10 MG/1
10 TABLET, FILM COATED, EXTENDED RELEASE ORAL DAILY
COMMUNITY

## 2022-06-07 RX ORDER — TRAZODONE HYDROCHLORIDE 50 MG/1
50 TABLET ORAL NIGHTLY PRN
COMMUNITY

## 2022-06-07 ASSESSMENT — PAIN SCALES - GENERAL: PAINLEVEL_OUTOF10: 3

## 2022-06-07 ASSESSMENT — KOOS JR
STRAIGHTENING KNEE FULLY: 1
STANDING UPRIGHT: 3
BENDING TO THE FLOOR TO PICK UP OBJECT: 3
GOING UP OR DOWN STAIRS: 2
HOW SEVERE IS YOUR KNEE STIFFNESS AFTER FIRST WAKING IN MORNING: 3
TWISING OR PIVOTING ON KNEE: 4
RISING FROM SITTING: 3
KOOS JR TOTAL INTERVAL SCORE: 39.625

## 2022-06-07 NOTE — PROGRESS NOTES
22 1300   Oxygen Therapy/Pulse Ox   O2 Therapy Room air   Heart Rate 73   SpO2 96 %     Initial respiratory Assessment completed with pt. Pt was interviewed and evaluated in Joint camp prior to surgery. Patient ID:  Albino Sutherland  [id number]  52 y.o.  1960  Surgeon: Dr. Arelis Wolf  Date of Surgery: [email or web address]  Procedure: Total Right Knee Arthroplasty  Primary Care Physician: Kris Mar, APRN - [phone]  Specialists:       SAT  96  %  HR 73    FEV1:  1.40  % PREDICTED:55   %   DENIES SOB    BS:DIMINISHED      Pt taught proper COUGH technique  IS REVIEWED WITH PT AS WELL AS BENEFITS OF USING IS IN SEDENTARY PTS. DIAPHRAGMATIC BREATHING EXERCISE INSTRUCTIONS GIVEN    History of smoking:   DENIES                 Quit date:         Secondhand smoke:FATHER    Past procedures with Oxygen desaturation or delayed awakenin CHF AFTER SURGERY- PT STATES FLUID OVERLOAD    Past Medical History:   Diagnosis Date    IRINA (acute kidney injury) ([facility] [address] 2020    Arthritis     back    Congestive heart failure ([facility] [address] 2020    per cardio note (21) \"shows normal LV function, grade I diastolic dysfunction and no significant valvular abnormalities. \"    Diabetes mellitus ([address]     insulin, -110, S&s hypoglycemia BS 60's, 10/2020 A1c 7.6    Fatty liver     GERD (gastroesophageal reflux disease)     medication    Hashimoto's disease     History of MRSA infection     Hypertension     controlled with meds    Liver disease     CROW (nonalcoholic steatohepatitis)     Psychiatric disorder     anxiety and depression    Renal insufficiency     per pt secondary to diabetes, followed by [facility]    Sleep apnea 2021    cpap at     Thyroid disease         Respiratory history:DENIES SOB                                                               Respiratory meds:  DENIES    FAMILY PRESENT:             NO     PAST SLEEP STUDY:        YES HX OF KHAI:                        YES                      KHAI assessment:     DANGERS OF UNTREATED KHAI EXPLAINED TO PT.                                          SLEEPS ON SIDE         PHYSICAL EXAM   Body mass index is 48.58 kg/m². Vitals:    06/07/22 1300   BP:    Pulse: (P) 73   Resp:    Temp:    SpO2: (P) 96%     Neck circumference: 45.5     cm    Loud snoring:                                                 YES             Witnessed apnea or wakening gasping or choking:        DENIES         Awakens with headaches:                                               DENIES  Morning or daytime tiredness/ sleepiness:                               TIRED  Dry mouth or sore throat in morning:            YES                                              Chu stage:  4                                   SACS score:35  Stop Bang                                    Dangers of untreated KHAI reviewed with pt. Pt. Is positive for KHAI and uses HOME CPAP and will bring to Hospital day of surgery. PT WILL NEED ASSISTANCE PLACING CPAP ON HS DURING HOSPITALIZATION.   ASSESS Q SHIFT  ALBUTEROL Q6 PRN                                        Referrals:    Pt. Phone Number:

## 2022-06-07 NOTE — PROGRESS NOTES
Patient verified name and . Order for consent NOT found in EHR; patient verified. Type 3 surgery, joint assessment complete. Labs per surgeon: No orders received. Labs per anesthesia protocol: cbc ,bmp, pt/ptt; results pending. T&S DOS and POC glucose DOS; orders signed and held in EHR. EKG: Completed today; results to be reviewed by anesthesia--charge nurse to f/u after previous EKG tracing received from [facility]. Prescription for Mupirocin ointment 22g tube, apply intranasally to both nostrils BID x5 days pre-operatively or for a total of 10 doses; called to [facility] at [phone]. Pulmonary note (21), Cardiology note (21), and Echo (21) located in EHR if needed. Hospital approved surgical skin cleanser and instructions to return bottle on DOS given per hospital policy. Patient provided with handouts including Guide to Surgery, Pain Management, Hand Hygiene, Blood Transfusion Education, and [place] [facility] Brochure. Patient answered medical/surgical history questions at their best of ability. All prior to admission medications documented in [facility]. Original medication prescription bottle NOT visualized during patient appointment. Patient instructed to hold all vitamins, supplements, herbals 3 weeks prior to surgery and NSAIDS/ASA 5 days prior to surgery. Patient teach back successful and patient demonstrates knowledge of instruction.

## 2022-06-07 NOTE — PROGRESS NOTES
The below lab results are within anesthesia limits. Results for Iron Redd" (MRN [id number]) as of 6/7/2022 14:11   Ref.  Range 6/7/2022 12:54   Sodium Latest Ref Range: 136 - 145 mmol/L 138   Potassium Latest Ref Range: 3.5 - 5.1 mmol/L 3.9   Chloride Latest Ref Range: 98 - 107 mmol/L 104   CO2 Latest Ref Range: 21 - 32 mmol/L 33 (H)   BUN,BUNPL Latest Ref Range: 8 - 23 MG/DL 20   Creatinine Latest Ref Range: 0.6 - 1.0 MG/DL 1.08 (H)   Anion Gap Latest Ref Range: 7 - 16 mmol/L 1 (L)   GFR Non- Latest Ref Range: >60 ml/min/1.73m2 55 (L)   GFR  Latest Ref Range: >60 ml/min/1.73m2 >60   GLUCOSE, FASTING,GF Latest Ref Range: 65 - 100 mg/dL 102 (H)   CALCIUM, SERUM, [id number] Latest Ref Range: 8.3 - 10.4 MG/DL 10.3   WBC Latest Ref Range: 4.3 - 11.1 K/uL 6.9   RBC Latest Ref Range: 4.05 - 5.2 M/uL 4.80   Hemoglobin Quant Latest Ref Range: 11.7 - 15.4 g/dL 13.8   Hematocrit Latest Ref Range: 35.8 - 46.3 % 43.6   MCV Latest Ref Range: 79.6 - 97.8 FL 90.8   MCH Latest Ref Range: 26.1 - 32.9 PG 28.8   MCHC Latest Ref Range: 31.4 - 35.0 g/dL 31.7   MPV Latest Ref Range: 9.4 - 12.3 FL 11.8   RDW Latest Ref Range: 11.9 - 14.6 % 13.7   Platelet Count Latest Ref Range: 150 - 450 K/uL 201   Nucleated Red Blood Cells Latest Ref Range: 0.0 - 0.2 K/uL 0.00   Prothrombin Time Latest Ref Range: 12.6 - 14.5 sec 13.6   INR Latest Units:   1.0   PTT Latest Ref Range: 24.1 - 35.1 SEC 25.2

## 2022-06-07 NOTE — PROGRESS NOTES
PLEASE CONTINUE TAKING ALL PRESCRIPTION MEDICATIONS UP TO THE DAY OF SURGERY UNLESS OTHERWISE DIRECTED BELOW. DISCONTINUE all vitamins, herbals and supplements 21 days prior to surgery. DISCONTINUE Non-Steriodal Anti-Inflammatory (NSAIDS) such as Advil, Ibuprofen, and Aleve 5 days prior to surgery. Home Medications to HOLD       All vitamins, supplements, and herbals stop today. All NSAIDs such as Advil, Aleve, Ibuprofen, Diclofenac, Naproxen, Ketorolac (Toradol), etc. Stop 5 days prior to surgery. Home Medications to take  the day of surgery   Tramadol if needed, Zofran if needed, methocarbamol if needed, Levothyroxine, Pantoprazole          Comments   On 6/15/22 (day before surgery) take 80 units of Toujeo in the morning and 64 units in the evening. On 6/16/22 (the day of surgery) take 40 units of Toujeo in the morning. *The day before surgery, 6/15/22, take Acetaminophen (Tylenol) 1000mg in the morning and again at bedtime. Can substitute 650mg Tylenol*     BRING: CPAP, incentive spirometer              Please do not bring home medications with you on the day of surgery unless otherwise directed by your nurse. If you are instructed to bring home medications, please give them to your nurse as they will be administered by the nursing staff. If you have any questions, please call [facility] [place] [place] [phone] or [facility] [phone].

## 2022-06-07 NOTE — PROGRESS NOTES
Tamika Alcaraz  : 1960  Primary: Umr   Secondary:  [facility] Camp at [address] [place] [address].  [phone]      Physical Therapy Prehab Evaluation Summary:2022   Time In/Out   PT Charge Capture  Episode     MEDICAL/REFERRING DIAGNOSIS: Unilateral primary osteoarthritis, right knee [M17.11]  REFERRING PHYSICIAN: Brittney Olson MD    ICD-10: Treatment Diagnosis:   · Pain in Right Knee (M25.561)  · Stiffness of Right Knee, Not elsewhere classified (M25.661)  · Difficulty in walking, Not elsewhere classified (R26.2)  · Other abnormalities of gait and mobility (R26.89)    DATE OF SURGERY: 22  Assessment:   COMMENTS:  Pt with pain in right knee joint scheduled for right tka. Pt plans to return home following hospital stay. Pt's daughter and  to assist, pt's  present today. PROBLEM LIST:   (Impacting functional limitations):  Ms. Tomer Matthews presents with the following lower extremity(s) problems:  1. Transfers  2. Gait  3. Strength  4. Range of Motion  5. Pain INTERVENTIONS PLANNED:   (Benefits and precautions of physical therapy have been discussed with the patient.)  1. Home Exercise Program  2. Educational Discussion       GOALS: (Goals have been discussed and agreed upon with patient.)  Discharge Goals: Time Frame: 1 Day  1. Patient will demonstrate independence with a home exercise program designed to increase strength, range of motion and pain control to minimize functional deficits and optimize patient for total joint replacement.     Subjective:   Past Medical History/Comorbidities:   Ms. Tomer Matthews  has a past medical history of IRINA (acute kidney injury) ([facility] [address] Arthritis, Congestive heart failure ([facility] [address] Diabetes mellitus ([facility] [address] Fatty liver, GERD (gastroesophageal reflux disease), Hashimoto's disease, History of MRSA infection, Hypertension, Liver disease, CROW (nonalcoholic steatohepatitis), Psychiatric disorder, Renal insufficiency, Sleep apnea, and Thyroid disease. Ms. Jose Reddy  has a past surgical history that includes Hysterectomy; Tonsillectomy; Refractive surgery; Cholecystectomy; orthopedic surgery (Right, 2017); orthopedic surgery (Left, 2020); and Colonoscopy (N/A, 4/9/2021).     Allergies:  Balsam peru-castor oil, Clobetasol, Cobalt, Dapagliflozin, Dulaglutide, Ezetimibe, Linalool, Liraglutide, Lisinopril, Metformin hcl, and Erythromycin    Current Medications:  Refer to pre-assessment nursing note    Home Set-Up/Prior Level of Function:  Lives With: Spouse  Type of Home: House  Home Layout: One level  Home Access: Level entry    Dominant Side:  Dominant Hand: : Right      Current Functional Status:   Ambulation:  [] Independent  [] Walk Indoors Only  [] Walk Outdoors  [x] Use Assistive Device wheelchair for long distance  [] Use Wheelchair Only Dressing:  [] [address] from Someone for:  [x] Sock/Shoes  [] Pants  [] Everything   Bathing/Showering:   [x] Independent  [] Requires Assistance from Someone  [] [address]:  [] Routine house and yard work  [x] Light Housework Only  [] None     Objective:   PAIN:   Pre-Treatment Pain  Pain Assessment: 0-10  Pain Level: 3    Post Treatment: 3    Outcome Measure:   HOOS-JR:       KOOS-JR:  KOOS, . Knee Survey Score: 19    LOWER EXTREMITY GROSS EVALUATION:  RIGHT LE   Within Functional Limits   Abnormal   Comments   Strength [x] []     Range of Motion [x] []        LEFT LE Within Functional Limits   Abnormal   Comments   Strength [x] []     Range of Motion [x] []       UPPER EXTREMITY GROSS EVALUATION:     Within Functional Limits   Abnormal   Comments   Strength [x] []    Range of Motion [x] []      SENSATION  Sensation [x]       COGNITION/  PERCEPTION: Intact Impaired (Comments):   Orientation [x]     Vision [x]     Hearing [x]     Cognition  [x]       TRANSFERS: I Mod I S SBA CGA Min Mod Max Total  NT x2 Comments:   Sit to Stand [x] [] [] [] [] [] [] [] [] [] []    Stand to Sit [x] [] [] [] [] [] [] [] [] [] []    Stand pivot [] [] [] [] [] [] [] [] [] [] []     [] [] [] [] [] [] [] [] [] [] []    I=Independent, Mod I=Modified Independent, S=Supervision, SBA=Standby Assistance, CGA=Contact Guard Assistance,   Min=Minimal Assistance, Mod=Moderate Assistance, Max=Maximal Assistance, Total=Total Assistance, NT=Not Tested    BALANCE: Good Fair+ Fair Fair- Poor NT Comments   Sitting Static [x] [] [] [] [] []    Sitting Dynamic [x] [] [] [] [] []              Standing Static [x] [] [] [] [] []    Standing Dynamic [x] [] [] [] [] []      Postural Assessment:   N/A    GAIT: I Mod I S SBA CGA Min Mod Max Total  NT x2 Comments:   Level of Assistance [x] [] [] [] [] [] [] [] [] [] []    Weightbearing Status       Distance  300 feet    Gait Quality Decreased anastasia , Decreased step length and Decreased stance    DME None     Stairs      Ramp     I=Independent, Mod I=Modified Independent, S=Supervision, SBA=Standby Assistance, CGA=Contact Guard Assistance,   Min=Minimal Assistance, Mod=Moderate Assistance, Max=Maximal Assistance, Total=Total Assistance, NT=Not Tested    TREATMENT:   EVALUATION: LOW COMPLEXITY: (Untimed Charge)    TREATMENT PLAN:   Effective Dates: 6/7/2022 TO 6/7/2022. Treatment/Session Assessment:  Patient was instructed in PT- HEP to increase strength and ROM in LEs. Answered all questions. Frequency/Duration: Patient to continue to perform home exercise program at least twice per day up until her surgery. EDUCATION: Education Given To: Patient  Education Provided: Role of [address] [place] [address]: Ms. Miki Andrade is expected to optimize her lower extremity strength and ROM in preparation for joint replacement surgery. REASON FOR CONTINUED SERVICES: Achieve baseline assesment of musculoskeletal system, functional mobility and home environment. , educate in PT HEP in preparation for surgery, educate in hospital plan of care. COMPLIANCE WITH PROGRAM/EXERCISE: compliant most of the time, Will assess as treatment progresses. TOTAL TREATMENT DURATION:  Time In: 3853  Time Out: 5520  Minutes: 30    Regarding Tamika Alcaraz therapy, I certify that the treatment plan above will be carried out by a therapist or under their direction.   Thank you for this referral,  Sandip Rebolledo, PT

## 2022-06-08 NOTE — OR NURSING
Dr. Hubbard Cheadle reviewed and ok'd for surgery ekg 12/29/20. Cosentyx Counseling:  I discussed with the patient the risks of Cosentyx including but not limited to worsening of Crohn's disease, immunosuppression, allergic reactions and infections.  The patient understands that monitoring is required including a PPD at baseline and must alert us or the primary physician if symptoms of infection or other concerning signs are noted.

## 2022-06-10 ENCOUNTER — OFFICE VISIT (OUTPATIENT)
Dept: ORTHOPEDIC SURGERY | Age: 62
End: 2022-06-10

## 2022-06-10 DIAGNOSIS — M17.11 PRIMARY OSTEOARTHRITIS OF RIGHT KNEE: Primary | ICD-10-CM

## 2022-06-10 PROCEDURE — PREOPEXAM PRE-OP EXAM: Performed by: PHYSICIAN ASSISTANT

## 2022-06-10 NOTE — H&P
H&P    Patient ID:  Opal Mcdaniel  [id number]  87 y.o.  1960  Surgeon:  William Philip MD  Date of Surgery: * No surgery date entered *  Procedure: Robot assisted right Total Knee Arthroplasty  Primary Care Physician: PUNEET Radford NP        Subjective:  Opal Mcdaniel is a 64 y.o. White (non-) female who presents with right knee pain. They have a history of right knee pain for several months. Symptoms worse with walking long distances and relieved with rest. Conservative treatment consisting of  activity modification and injections have not helped. The patient lives with their family. The patients goal after surgery is improved pain and function. Past Medical History:   Diagnosis Date    IRINA (acute kidney injury) ([facility] [address] 11/2020    Arthritis     back    Congestive heart failure ([facility] [address] 09/2020    per cardio note (8/19/21) \"shows normal LV function, grade I diastolic dysfunction and no significant valvular abnormalities. \"    Diabetes mellitus ([facility] [address]     insulin, -110, S&s hypoglycemia BS 60's, 10/2020 A1c 7.6    Fatty liver     GERD (gastroesophageal reflux disease)     medication    Hashimoto's disease     History of MRSA infection 2020    Hypertension     controlled with meds    Liver disease     CROW (nonalcoholic steatohepatitis)     Psychiatric disorder     anxiety and depression    Renal insufficiency     per pt secondary to diabetes, followed by [facility]    Sleep apnea 02/2021    cpap at     Thyroid disease       Past Surgical History:   Procedure Laterality Date    CHOLECYSTECTOMY      COLONOSCOPY N/A 4/9/2021    COLONOSCOPY/ 50 performed by [facility]indy Pathak [facility] at [facility] ENDOSCOPY    HYSTERECTOMY (CERVIX STATUS UNKNOWN)      ORTHOPEDIC SURGERY Right 2017    x 4 surgeries on ankles    ORTHOPEDIC SURGERY Left 2020    x 2 surgeries on ankles    REFRACTIVE SURGERY      TONSILLECTOMY       Family History Problem Relation Age of Onset    Cancer Sister     Other Mother         ALS    COPD Father     Diabetes Sister     Hypertension Mother     Asthma Sister     Heart Disease Father       Social History     Tobacco Use    Smoking status: Never Smoker    Smokeless tobacco: Never Used   Substance Use Topics    Alcohol use: Not Currently       Prior to Admission medications    Medication Sig Start Date End Date Taking? Authorizing Provider   glipiZIDE (GLUCOTROL XL) 10 MG extended release tablet Take 10 mg by mouth daily    Historical Provider, MD   ketorolac (TORADOL) 10 MG tablet Take 10 mg by mouth every 6 hours as needed for Pain    Historical Provider, MD   levothyroxine (SYNTHROID) 100 MCG tablet Take 100 mcg by mouth Daily    Historical Provider, MD   methocarbamol (ROBAXIN) 750 MG tablet Take 750 mg by mouth 4 times daily    Historical Provider, MD   Insulin Glargine, 2 Unit Dial, (TOUJEO MAX SOLOSTAR) 300 UNIT/ML SOPN Inject 80 Units into the skin in the morning and at bedtime    Historical Provider, MD   traMADol (ULTRAM) 50 MG tablet Take 50 mg by mouth every 6 hours as needed for Pain.     Historical Provider, MD   traZODone (DESYREL) 50 MG tablet Take 50 mg by mouth nightly as needed for Sleep    Historical Provider, MD   vitamin B-12 (CYANOCOBALAMIN) 1000 MCG tablet Take 1,000 mcg by mouth daily    Historical Provider, MD   acetaminophen (TYLENOL) 325 MG tablet Take 650 mg by mouth every 4 hours as needed    Ar Automatic Reconciliation   diclofenac (VOLTAREN) 50 MG EC tablet Take by mouth 2 times daily    Ar Automatic Reconciliation   dicyclomine (BENTYL) 10 MG capsule Take 10 mg by mouth as needed    Ar Automatic Reconciliation   ergocalciferol (ERGOCALCIFEROL) 1.25 MG (01708 UT) capsule Take 50,000 Units by mouth Every Tuesday and Friday    Ar Automatic Reconciliation   insulin aspart (NOVOLOG) 100 UNIT/ML injection pen Inject 1 Units into the skin 3 times daily (before meals) Sliding scale    Ar Automatic Reconciliation   losartan (COZAAR) 100 MG tablet Take 100 mg by mouth daily    Ar Automatic Reconciliation   ondansetron (ZOFRAN) 8 MG tablet Take 8 mg by mouth every 8 hours as needed    Ar Automatic Reconciliation   pantoprazole (PROTONIX) 40 MG tablet Take 40 mg by mouth daily    Ar Automatic Reconciliation   spironolactone (ALDACTONE) 25 MG tablet Take by mouth daily    Ar Automatic Reconciliation   torsemide (DEMADEX) 20 MG tablet TAKE 1 TABLET BY MOUTH EVERY DAY 8/13/21   Ar Automatic Reconciliation   traZODone (DESYREL) 50 MG tablet Take 50 mg by mouth nightly    Ar Automatic Reconciliation     Allergies   Allergen Reactions    Balsam [place]-Castor Oil      \"irritant'     Clobetasol      \"irritant\"     Cobalt Itching    Dapagliflozin Other (See Comments)     Yeast infection    Dulaglutide Other (See Comments)     Other reaction(s): Nausea and/or vomiting-Intolerance    Ezetimibe Other (See Comments)    Linalool      \"irritant\"     Liraglutide Other (See Comments)    Lisinopril Other (See Comments)     Heaviness in extremities, cough    Metformin Hcl Other (See Comments)    Erythromycin Nausea And Vomiting and Other (See Comments)     Severe GI upset        REVIEW OF SYSTEMS:  CONSTITUTIONAL: Denies fever, decreased appetite, weight loss/gain, night sweats or fatigue. HEENT: Denies vision or hearing changes. denies glasses. denies hearing aids. CARDIAC: Denies CP, palpitations, rheumatic fever, murmur, peripheral edema, carotid artery disease or syncopal episodes. RESPIRATORY: Denies dyspnea on exertion, asthma, COPD or orthopnea. GI: Denies GERD, history of GI bleed or melena, PUD, hepatitis or cirrhosis. : Denies dysuria, hematuria. denies BPH symptoms. HEMATOLOGIC: Denies anemia or blood disorders. ENDOCRINE: Denies thyroid disease. MUSCULOSKELETAL: See HPI. NEUROLOGIC: Denies seizure, peripheral neuropathy or memory loss. PSYCH: Denies depression, anxiety or insomnia.  SKIN: Denies rash or open sores. Objective:    PHYSICAL EXAM  GENERAL: No data found. EYES: PERRL. EOM intact. MOUTH:Teeth and Gums normal. NECK: Full ROM. Trachea midline. No thyromegaly or JVD. CARDIOVASCULAR: Regular rate and rhythm. No murmur or gallops. No carotid bruits. Peripheral pulses: radial 2 +, PT 2+, DP 2+ bilaterally. LUNGS: CTA bilaterally. No wheezes, rhonchi or rales. GI: positive BS. Abdomen nontender. NEUROLOGIC: Alert and oriented x 3. Bilateral equal strong had grasp and bilateral equal strong plantar flexion and dorsiflexion. GAIT: abnormal MUSCULOSKELETAL: ROM: full with pain. Tenderness: over the medial and lateral joint lines. Crepitus: present. SKIN: No rash, bruising, swelling, redness or warmth. Labs:  No results found for this or any previous visit (from the past 24 hour(s)). Xray Right knee: joint space narrowing with advanced degenerative changes. Assessment:  Advanced Right Knee Osteoarthritis. Robot assisted Total Right Knee Arthroplasty Indicated. Patient Active Problem List   Diagnosis    Hyperlipidemia, mixed    Closed compression fracture of thoracic vertebra (HCC)    Osteoporosis    Diabetic retinopathy associated with type 2 diabetes mellitus ([address]    Acute kidney injury ([address]    Hypothyroidism    Acute heart failure with preserved ejection fraction (HCC)    Obesity, morbid (HCC)    Bilateral edema of lower extremity    Difficulty with CPAP nasal mask use    HAGAN (dyspnea on exertion)    Essential hypertension    KHAI on CPAP    Diabetic neuropathy (HCC)    Depression       Plan:  I have advised the patient of the risks and consequences, including possible complications of performing total joint replacement, as well as not doing this operation. The patient had the opportunity to ask questions and have them answered to their satisfaction.      Signed:  NELSON Anderson 6/10/2022

## 2022-06-10 NOTE — PROGRESS NOTES
Name: Ramone Girard  YOB: 1960  Gender: female  MRN: [id number]    Pre-Op     CC: RIGHT KNEE PAIN       This patient comes in for pre-op exam prior to RIGHT TKA. The patient has been cleared preoperatively. I counseled the patient once again about the risks of infection, DVT formation, expected time of hospitalization, anticipated recovery time as well as rehab needs and expectations for recovery. The patient would like to proceed and we will do so as planned. The patient was provided with pain medications as well as DVT prophylaxis to have on hand postoperatively at the time of discharge from hospital. All pertinent questions asked by the patient were answered.     NELSON Gomez

## 2022-06-13 ENCOUNTER — PREP FOR PROCEDURE (OUTPATIENT)
Dept: ORTHOPEDIC SURGERY | Age: 62
End: 2022-06-13

## 2022-06-13 DIAGNOSIS — M17.11 PRIMARY OSTEOARTHRITIS OF RIGHT KNEE: Primary | ICD-10-CM

## 2022-06-13 RX ORDER — ACETAMINOPHEN 325 MG/1
1000 TABLET ORAL ONCE
Status: CANCELLED | OUTPATIENT
Start: 2022-06-13 | End: 2022-06-13

## 2022-06-16 ENCOUNTER — HOSPITAL ENCOUNTER (INPATIENT)
Age: 62
LOS: 1 days | Discharge: HOME | DRG: 470 | End: 2022-06-17
Attending: ORTHOPAEDIC SURGERY | Admitting: ORTHOPAEDIC SURGERY
Payer: COMMERCIAL

## 2022-06-16 ENCOUNTER — ANESTHESIA EVENT (OUTPATIENT)
Dept: SURGERY | Age: 62
DRG: 470 | End: 2022-06-16
Payer: COMMERCIAL

## 2022-06-16 ENCOUNTER — ANESTHESIA (OUTPATIENT)
Dept: SURGERY | Age: 62
DRG: 470 | End: 2022-06-16
Payer: COMMERCIAL

## 2022-06-16 ENCOUNTER — HOME HEALTH ADMISSION (OUTPATIENT)
Dept: HOME HEALTH SERVICES | Facility: HOME HEALTH | Age: 62
End: 2022-06-16
Payer: COMMERCIAL

## 2022-06-16 DIAGNOSIS — Z96.651 TOTAL KNEE REPLACEMENT STATUS, RIGHT: Primary | ICD-10-CM

## 2022-06-16 PROBLEM — M17.11 ARTHRITIS OF RIGHT KNEE: Status: ACTIVE | Noted: 2022-06-16

## 2022-06-16 LAB
GLUCOSE BLD STRIP.AUTO-MCNC: 126 MG/DL (ref 65–100)
GLUCOSE BLD STRIP.AUTO-MCNC: 226 MG/DL (ref 65–100)
GLUCOSE BLD STRIP.AUTO-MCNC: 399 MG/DL (ref 65–100)
GLUCOSE BLD STRIP.AUTO-MCNC: 91 MG/DL (ref 65–100)
SERVICE CMNT-IMP: ABNORMAL
SERVICE CMNT-IMP: NORMAL

## 2022-06-16 PROCEDURE — 6360000002 HC RX W HCPCS

## 2022-06-16 PROCEDURE — C1776 JOINT DEVICE (IMPLANTABLE): HCPCS | Performed by: ORTHOPAEDIC SURGERY

## 2022-06-16 PROCEDURE — 97161 PT EVAL LOW COMPLEX 20 MIN: CPT

## 2022-06-16 PROCEDURE — S2900 ROBOTIC SURGICAL SYSTEM: HCPCS | Performed by: ORTHOPAEDIC SURGERY

## 2022-06-16 PROCEDURE — 97165 OT EVAL LOW COMPLEX 30 MIN: CPT

## 2022-06-16 PROCEDURE — 6370000000 HC RX 637 (ALT 250 FOR IP): Performed by: ANESTHESIOLOGY

## 2022-06-16 PROCEDURE — C1713 ANCHOR/SCREW BN/BN,TIS/BN: HCPCS | Performed by: ORTHOPAEDIC SURGERY

## 2022-06-16 PROCEDURE — 6370000000 HC RX 637 (ALT 250 FOR IP): Performed by: PHYSICIAN ASSISTANT

## 2022-06-16 PROCEDURE — 3700000000 HC ANESTHESIA ATTENDED CARE: Performed by: ORTHOPAEDIC SURGERY

## 2022-06-16 PROCEDURE — 2580000003 HC RX 258: Performed by: ORTHOPAEDIC SURGERY

## 2022-06-16 PROCEDURE — 6360000002 HC RX W HCPCS: Performed by: ANESTHESIOLOGY

## 2022-06-16 PROCEDURE — 2580000003 HC RX 258: Performed by: PHYSICIAN ASSISTANT

## 2022-06-16 PROCEDURE — 27447 TOTAL KNEE ARTHROPLASTY: CPT | Performed by: PHYSICIAN ASSISTANT

## 2022-06-16 PROCEDURE — 6370000000 HC RX 637 (ALT 250 FOR IP): Performed by: FAMILY MEDICINE

## 2022-06-16 PROCEDURE — 64447 NJX AA&/STRD FEMORAL NRV IMG: CPT | Performed by: ANESTHESIOLOGY

## 2022-06-16 PROCEDURE — 2720000010 HC SURG SUPPLY STERILE: Performed by: ORTHOPAEDIC SURGERY

## 2022-06-16 PROCEDURE — 6360000002 HC RX W HCPCS: Performed by: ORTHOPAEDIC SURGERY

## 2022-06-16 PROCEDURE — 6360000002 HC RX W HCPCS: Performed by: PHYSICIAN ASSISTANT

## 2022-06-16 PROCEDURE — 7100000001 HC PACU RECOVERY - ADDTL 15 MIN: Performed by: ORTHOPAEDIC SURGERY

## 2022-06-16 PROCEDURE — 94760 N-INVAS EAR/PLS OXIMETRY 1: CPT

## 2022-06-16 PROCEDURE — 2500000003 HC RX 250 WO HCPCS: Performed by: ORTHOPAEDIC SURGERY

## 2022-06-16 PROCEDURE — 6360000002 HC RX W HCPCS: Performed by: NURSE ANESTHETIST, CERTIFIED REGISTERED

## 2022-06-16 PROCEDURE — 97535 SELF CARE MNGMENT TRAINING: CPT

## 2022-06-16 PROCEDURE — 6370000000 HC RX 637 (ALT 250 FOR IP): Performed by: ORTHOPAEDIC SURGERY

## 2022-06-16 PROCEDURE — 1100000000 HC RM PRIVATE

## 2022-06-16 PROCEDURE — 27447 TOTAL KNEE ARTHROPLASTY: CPT | Performed by: ORTHOPAEDIC SURGERY

## 2022-06-16 PROCEDURE — 20985 CPTR-ASST DIR MS PX: CPT | Performed by: ORTHOPAEDIC SURGERY

## 2022-06-16 PROCEDURE — 82962 GLUCOSE BLOOD TEST: CPT

## 2022-06-16 PROCEDURE — 2500000003 HC RX 250 WO HCPCS: Performed by: NURSE ANESTHETIST, CERTIFIED REGISTERED

## 2022-06-16 PROCEDURE — 8E0Y0CZ ROBOTIC ASSISTED PROCEDURE OF LOWER EXTREMITY, OPEN APPROACH: ICD-10-PCS | Performed by: ORTHOPAEDIC SURGERY

## 2022-06-16 PROCEDURE — 2709999900 HC NON-CHARGEABLE SUPPLY: Performed by: ORTHOPAEDIC SURGERY

## 2022-06-16 PROCEDURE — 7100000000 HC PACU RECOVERY - FIRST 15 MIN: Performed by: ORTHOPAEDIC SURGERY

## 2022-06-16 PROCEDURE — 0SRC0J9 REPLACEMENT OF RIGHT KNEE JOINT WITH SYNTHETIC SUBSTITUTE, CEMENTED, OPEN APPROACH: ICD-10-PCS | Performed by: ORTHOPAEDIC SURGERY

## 2022-06-16 PROCEDURE — 3700000001 HC ADD 15 MINUTES (ANESTHESIA): Performed by: ORTHOPAEDIC SURGERY

## 2022-06-16 PROCEDURE — 3600000019 HC SURGERY ROBOT ADDTL 15MIN: Performed by: ORTHOPAEDIC SURGERY

## 2022-06-16 PROCEDURE — 97110 THERAPEUTIC EXERCISES: CPT

## 2022-06-16 PROCEDURE — 3600000009 HC SURGERY ROBOT BASE: Performed by: ORTHOPAEDIC SURGERY

## 2022-06-16 DEVICE — INSERT TIB CS 3 10 MM ARTC KNEE BEAR TECHNOLOGY TRIATHLON: Type: IMPLANTABLE DEVICE | Site: KNEE | Status: FUNCTIONAL

## 2022-06-16 DEVICE — COMPONENT PAT DIA29MM THK9MM SUP INFERIOR KNEE CONVENTIONAL: Type: IMPLANTABLE DEVICE | Site: KNEE | Status: FUNCTIONAL

## 2022-06-16 DEVICE — COMPONENT PART KNEE CAPPED K1 STRYKER: Type: IMPLANTABLE DEVICE | Status: FUNCTIONAL

## 2022-06-16 DEVICE — BASEPLATE TIB SZ 3 UNIV KNEE TRITANIUM TOT STBL CEM: Type: IMPLANTABLE DEVICE | Site: KNEE | Status: FUNCTIONAL

## 2022-06-16 DEVICE — CEMENT BONE 40GM HI VISC PALACOS R: Type: IMPLANTABLE DEVICE | Site: KNEE | Status: FUNCTIONAL

## 2022-06-16 DEVICE — IMPLANTABLE DEVICE: Type: IMPLANTABLE DEVICE | Site: KNEE | Status: FUNCTIONAL

## 2022-06-16 RX ORDER — DIPHENHYDRAMINE HYDROCHLORIDE 50 MG/ML
12.5 INJECTION INTRAMUSCULAR; INTRAVENOUS
Status: DISCONTINUED | OUTPATIENT
Start: 2022-06-16 | End: 2022-06-16

## 2022-06-16 RX ORDER — HYDROMORPHONE HYDROCHLORIDE 1 MG/ML
0.5 INJECTION, SOLUTION INTRAMUSCULAR; INTRAVENOUS; SUBCUTANEOUS EVERY 5 MIN PRN
Status: DISCONTINUED | OUTPATIENT
Start: 2022-06-16 | End: 2022-06-16

## 2022-06-16 RX ORDER — OXYCODONE HYDROCHLORIDE 5 MG/1
5 TABLET ORAL EVERY 4 HOURS PRN
Status: DISCONTINUED | OUTPATIENT
Start: 2022-06-16 | End: 2022-06-17 | Stop reason: HOSPADM

## 2022-06-16 RX ORDER — LIDOCAINE HYDROCHLORIDE 10 MG/ML
1 INJECTION, SOLUTION INFILTRATION; PERINEURAL
Status: DISCONTINUED | OUTPATIENT
Start: 2022-06-16 | End: 2022-06-16 | Stop reason: HOSPADM

## 2022-06-16 RX ORDER — DICYCLOMINE HYDROCHLORIDE 10 MG/1
10 CAPSULE ORAL EVERY 6 HOURS PRN
Status: DISCONTINUED | OUTPATIENT
Start: 2022-06-16 | End: 2022-06-17 | Stop reason: HOSPADM

## 2022-06-16 RX ORDER — PANTOPRAZOLE SODIUM 40 MG/1
40 TABLET, DELAYED RELEASE ORAL DAILY
Status: DISCONTINUED | OUTPATIENT
Start: 2022-06-16 | End: 2022-06-17 | Stop reason: HOSPADM

## 2022-06-16 RX ORDER — DIPHENHYDRAMINE HCL 25 MG
25 CAPSULE ORAL EVERY 6 HOURS PRN
Status: DISCONTINUED | OUTPATIENT
Start: 2022-06-16 | End: 2022-06-17 | Stop reason: HOSPADM

## 2022-06-16 RX ORDER — ROPIVACAINE HYDROCHLORIDE 2 MG/ML
INJECTION, SOLUTION EPIDURAL; INFILTRATION; PERINEURAL
Status: DISCONTINUED | OUTPATIENT
Start: 2022-06-16 | End: 2022-06-16 | Stop reason: SDUPTHER

## 2022-06-16 RX ORDER — EPHEDRINE SULFATE/0.9% NACL/PF 50 MG/5 ML
SYRINGE (ML) INTRAVENOUS PRN
Status: DISCONTINUED | OUTPATIENT
Start: 2022-06-16 | End: 2022-06-16 | Stop reason: SDUPTHER

## 2022-06-16 RX ORDER — SODIUM CHLORIDE 0.9 % (FLUSH) 0.9 %
5-40 SYRINGE (ML) INJECTION EVERY 12 HOURS SCHEDULED
Status: DISCONTINUED | OUTPATIENT
Start: 2022-06-16 | End: 2022-06-16

## 2022-06-16 RX ORDER — METHOCARBAMOL 750 MG/1
750 TABLET, FILM COATED ORAL 4 TIMES DAILY PRN
Status: DISCONTINUED | OUTPATIENT
Start: 2022-06-16 | End: 2022-06-17 | Stop reason: HOSPADM

## 2022-06-16 RX ORDER — SODIUM CHLORIDE 9 MG/ML
INJECTION, SOLUTION INTRAVENOUS PRN
Status: DISCONTINUED | OUTPATIENT
Start: 2022-06-16 | End: 2022-06-17 | Stop reason: HOSPADM

## 2022-06-16 RX ORDER — PROCHLORPERAZINE EDISYLATE 5 MG/ML
5 INJECTION INTRAMUSCULAR; INTRAVENOUS
Status: DISCONTINUED | OUTPATIENT
Start: 2022-06-16 | End: 2022-06-16

## 2022-06-16 RX ORDER — INSULIN GLARGINE 100 [IU]/ML
80 INJECTION, SOLUTION SUBCUTANEOUS 2 TIMES DAILY
Status: DISCONTINUED | OUTPATIENT
Start: 2022-06-16 | End: 2022-06-17 | Stop reason: HOSPADM

## 2022-06-16 RX ORDER — OXYCODONE HYDROCHLORIDE 5 MG/1
5 TABLET ORAL PRN
Status: COMPLETED | OUTPATIENT
Start: 2022-06-16 | End: 2022-06-16

## 2022-06-16 RX ORDER — HYDROMORPHONE HYDROCHLORIDE 2 MG/ML
1 INJECTION, SOLUTION INTRAMUSCULAR; INTRAVENOUS; SUBCUTANEOUS
Status: DISCONTINUED | OUTPATIENT
Start: 2022-06-16 | End: 2022-06-17 | Stop reason: HOSPADM

## 2022-06-16 RX ORDER — ONDANSETRON 2 MG/ML
4 INJECTION INTRAMUSCULAR; INTRAVENOUS
Status: DISCONTINUED | OUTPATIENT
Start: 2022-06-16 | End: 2022-06-16

## 2022-06-16 RX ORDER — OXYCODONE HYDROCHLORIDE 5 MG/1
10 TABLET ORAL EVERY 4 HOURS PRN
Status: DISCONTINUED | OUTPATIENT
Start: 2022-06-16 | End: 2022-06-17 | Stop reason: HOSPADM

## 2022-06-16 RX ORDER — ONDANSETRON 2 MG/ML
INJECTION INTRAMUSCULAR; INTRAVENOUS PRN
Status: DISCONTINUED | OUTPATIENT
Start: 2022-06-16 | End: 2022-06-16 | Stop reason: SDUPTHER

## 2022-06-16 RX ORDER — SENNA AND DOCUSATE SODIUM 50; 8.6 MG/1; MG/1
1 TABLET, FILM COATED ORAL 2 TIMES DAILY
Status: DISCONTINUED | OUTPATIENT
Start: 2022-06-16 | End: 2022-06-17 | Stop reason: HOSPADM

## 2022-06-16 RX ORDER — ROPIVACAINE HYDROCHLORIDE 2 MG/ML
INJECTION, SOLUTION EPIDURAL; INFILTRATION; PERINEURAL PRN
Status: DISCONTINUED | OUTPATIENT
Start: 2022-06-16 | End: 2022-06-16 | Stop reason: ALTCHOICE

## 2022-06-16 RX ORDER — TRANEXAMIC ACID 100 MG/ML
INJECTION, SOLUTION INTRAVENOUS PRN
Status: DISCONTINUED | OUTPATIENT
Start: 2022-06-16 | End: 2022-06-16 | Stop reason: SDUPTHER

## 2022-06-16 RX ORDER — MIDAZOLAM HYDROCHLORIDE 2 MG/2ML
2 INJECTION, SOLUTION INTRAMUSCULAR; INTRAVENOUS
Status: DISCONTINUED | OUTPATIENT
Start: 2022-06-16 | End: 2022-06-16 | Stop reason: HOSPADM

## 2022-06-16 RX ORDER — LOSARTAN POTASSIUM 50 MG/1
100 TABLET ORAL DAILY
Status: DISCONTINUED | OUTPATIENT
Start: 2022-06-17 | End: 2022-06-17 | Stop reason: HOSPADM

## 2022-06-16 RX ORDER — HYDROMORPHONE HYDROCHLORIDE 2 MG/ML
0.5 INJECTION, SOLUTION INTRAMUSCULAR; INTRAVENOUS; SUBCUTANEOUS
Status: DISCONTINUED | OUTPATIENT
Start: 2022-06-16 | End: 2022-06-17 | Stop reason: HOSPADM

## 2022-06-16 RX ORDER — ONDANSETRON 2 MG/ML
4 INJECTION INTRAMUSCULAR; INTRAVENOUS EVERY 6 HOURS PRN
Status: DISCONTINUED | OUTPATIENT
Start: 2022-06-16 | End: 2022-06-17 | Stop reason: HOSPADM

## 2022-06-16 RX ORDER — ACETAMINOPHEN 500 MG
1000 TABLET ORAL ONCE
Status: DISCONTINUED | OUTPATIENT
Start: 2022-06-16 | End: 2022-06-16 | Stop reason: HOSPADM

## 2022-06-16 RX ORDER — MAGNESIUM HYDROXIDE/ALUMINUM HYDROXICE/SIMETHICONE 120; 1200; 1200 MG/30ML; MG/30ML; MG/30ML
15 SUSPENSION ORAL EVERY 6 HOURS PRN
Status: DISCONTINUED | OUTPATIENT
Start: 2022-06-16 | End: 2022-06-17 | Stop reason: HOSPADM

## 2022-06-16 RX ORDER — GLIPIZIDE 5 MG/1
10 TABLET ORAL DAILY
Status: DISCONTINUED | OUTPATIENT
Start: 2022-06-17 | End: 2022-06-17 | Stop reason: HOSPADM

## 2022-06-16 RX ORDER — MIDAZOLAM HYDROCHLORIDE 1 MG/ML
INJECTION INTRAMUSCULAR; INTRAVENOUS PRN
Status: DISCONTINUED | OUTPATIENT
Start: 2022-06-16 | End: 2022-06-16 | Stop reason: SDUPTHER

## 2022-06-16 RX ORDER — SODIUM CHLORIDE 9 MG/ML
INJECTION, SOLUTION INTRAVENOUS PRN
Status: DISCONTINUED | OUTPATIENT
Start: 2022-06-16 | End: 2022-06-16

## 2022-06-16 RX ORDER — DEXAMETHASONE SODIUM PHOSPHATE 10 MG/ML
INJECTION INTRAMUSCULAR; INTRAVENOUS PRN
Status: DISCONTINUED | OUTPATIENT
Start: 2022-06-16 | End: 2022-06-16 | Stop reason: SDUPTHER

## 2022-06-16 RX ORDER — MIDAZOLAM HYDROCHLORIDE 1 MG/ML
INJECTION INTRAMUSCULAR; INTRAVENOUS
Status: COMPLETED
Start: 2022-06-16 | End: 2022-06-16

## 2022-06-16 RX ORDER — NALOXONE HYDROCHLORIDE 0.4 MG/ML
0.4 INJECTION, SOLUTION INTRAMUSCULAR; INTRAVENOUS; SUBCUTANEOUS PRN
Status: DISCONTINUED | OUTPATIENT
Start: 2022-06-16 | End: 2022-06-17 | Stop reason: HOSPADM

## 2022-06-16 RX ORDER — SODIUM CHLORIDE, SODIUM LACTATE, POTASSIUM CHLORIDE, CALCIUM CHLORIDE 600; 310; 30; 20 MG/100ML; MG/100ML; MG/100ML; MG/100ML
INJECTION, SOLUTION INTRAVENOUS CONTINUOUS
Status: DISCONTINUED | OUTPATIENT
Start: 2022-06-16 | End: 2022-06-16

## 2022-06-16 RX ORDER — INSULIN LISPRO 100 [IU]/ML
10 INJECTION, SOLUTION INTRAVENOUS; SUBCUTANEOUS ONCE
Status: COMPLETED | OUTPATIENT
Start: 2022-06-16 | End: 2022-06-16

## 2022-06-16 RX ORDER — SODIUM CHLORIDE 0.9 % (FLUSH) 0.9 %
5-40 SYRINGE (ML) INJECTION EVERY 12 HOURS SCHEDULED
Status: DISCONTINUED | OUTPATIENT
Start: 2022-06-16 | End: 2022-06-16 | Stop reason: HOSPADM

## 2022-06-16 RX ORDER — SPIRONOLACTONE 25 MG/1
25 TABLET ORAL DAILY
Status: DISCONTINUED | OUTPATIENT
Start: 2022-06-17 | End: 2022-06-17 | Stop reason: HOSPADM

## 2022-06-16 RX ORDER — ASPIRIN 81 MG/1
81 TABLET ORAL 2 TIMES DAILY
Status: DISCONTINUED | OUTPATIENT
Start: 2022-06-16 | End: 2022-06-17 | Stop reason: HOSPADM

## 2022-06-16 RX ORDER — SODIUM CHLORIDE 0.9 % (FLUSH) 0.9 %
5-40 SYRINGE (ML) INJECTION PRN
Status: DISCONTINUED | OUTPATIENT
Start: 2022-06-16 | End: 2022-06-17 | Stop reason: HOSPADM

## 2022-06-16 RX ORDER — SODIUM CHLORIDE 0.9 % (FLUSH) 0.9 %
5-40 SYRINGE (ML) INJECTION PRN
Status: DISCONTINUED | OUTPATIENT
Start: 2022-06-16 | End: 2022-06-16 | Stop reason: HOSPADM

## 2022-06-16 RX ORDER — KETOROLAC TROMETHAMINE 30 MG/ML
INJECTION, SOLUTION INTRAMUSCULAR; INTRAVENOUS PRN
Status: DISCONTINUED | OUTPATIENT
Start: 2022-06-16 | End: 2022-06-16 | Stop reason: ALTCHOICE

## 2022-06-16 RX ORDER — SODIUM CHLORIDE, SODIUM LACTATE, POTASSIUM CHLORIDE, CALCIUM CHLORIDE 600; 310; 30; 20 MG/100ML; MG/100ML; MG/100ML; MG/100ML
INJECTION, SOLUTION INTRAVENOUS CONTINUOUS
Status: DISCONTINUED | OUTPATIENT
Start: 2022-06-16 | End: 2022-06-16 | Stop reason: HOSPADM

## 2022-06-16 RX ORDER — INSULIN LISPRO 100 [IU]/ML
1 INJECTION, SOLUTION INTRAVENOUS; SUBCUTANEOUS
Status: DISCONTINUED | OUTPATIENT
Start: 2022-06-16 | End: 2022-06-17 | Stop reason: HOSPADM

## 2022-06-16 RX ORDER — PROMETHAZINE HYDROCHLORIDE 25 MG/1
25 TABLET ORAL EVERY 6 HOURS PRN
Status: DISCONTINUED | OUTPATIENT
Start: 2022-06-16 | End: 2022-06-17 | Stop reason: HOSPADM

## 2022-06-16 RX ORDER — SODIUM CHLORIDE 9 MG/ML
INJECTION, SOLUTION INTRAVENOUS PRN
Status: DISCONTINUED | OUTPATIENT
Start: 2022-06-16 | End: 2022-06-16 | Stop reason: HOSPADM

## 2022-06-16 RX ORDER — TRAZODONE HYDROCHLORIDE 50 MG/1
50 TABLET ORAL NIGHTLY PRN
Status: DISCONTINUED | OUTPATIENT
Start: 2022-06-16 | End: 2022-06-17 | Stop reason: HOSPADM

## 2022-06-16 RX ORDER — SODIUM CHLORIDE 9 MG/ML
INJECTION, SOLUTION INTRAVENOUS CONTINUOUS
Status: DISCONTINUED | OUTPATIENT
Start: 2022-06-16 | End: 2022-06-17 | Stop reason: HOSPADM

## 2022-06-16 RX ORDER — ACETAMINOPHEN 325 MG/1
650 TABLET ORAL EVERY 6 HOURS
Status: DISCONTINUED | OUTPATIENT
Start: 2022-06-16 | End: 2022-06-17 | Stop reason: HOSPADM

## 2022-06-16 RX ORDER — LEVOTHYROXINE SODIUM 0.1 MG/1
100 TABLET ORAL DAILY
Status: DISCONTINUED | OUTPATIENT
Start: 2022-06-17 | End: 2022-06-17 | Stop reason: HOSPADM

## 2022-06-16 RX ORDER — SODIUM CHLORIDE 0.9 % (FLUSH) 0.9 %
5-40 SYRINGE (ML) INJECTION EVERY 12 HOURS SCHEDULED
Status: DISCONTINUED | OUTPATIENT
Start: 2022-06-16 | End: 2022-06-17 | Stop reason: HOSPADM

## 2022-06-16 RX ORDER — ACETAMINOPHEN 500 MG
1000 TABLET ORAL ONCE
Status: COMPLETED | OUTPATIENT
Start: 2022-06-16 | End: 2022-06-16

## 2022-06-16 RX ORDER — DIPHENHYDRAMINE HYDROCHLORIDE 50 MG/ML
25 INJECTION INTRAMUSCULAR; INTRAVENOUS EVERY 6 HOURS PRN
Status: DISCONTINUED | OUTPATIENT
Start: 2022-06-16 | End: 2022-06-17 | Stop reason: HOSPADM

## 2022-06-16 RX ORDER — TORSEMIDE 20 MG/1
20 TABLET ORAL DAILY
Status: DISCONTINUED | OUTPATIENT
Start: 2022-06-17 | End: 2022-06-17 | Stop reason: HOSPADM

## 2022-06-16 RX ORDER — SODIUM CHLORIDE 0.9 % (FLUSH) 0.9 %
5-40 SYRINGE (ML) INJECTION PRN
Status: DISCONTINUED | OUTPATIENT
Start: 2022-06-16 | End: 2022-06-16

## 2022-06-16 RX ORDER — PROPOFOL 10 MG/ML
INJECTION, EMULSION INTRAVENOUS CONTINUOUS PRN
Status: DISCONTINUED | OUTPATIENT
Start: 2022-06-16 | End: 2022-06-16 | Stop reason: SDUPTHER

## 2022-06-16 RX ADMIN — OXYCODONE 10 MG: 5 TABLET ORAL at 21:19

## 2022-06-16 RX ADMIN — Medication 3 AMPULE: at 09:04

## 2022-06-16 RX ADMIN — ONDANSETRON 4 MG: 2 INJECTION INTRAMUSCULAR; INTRAVENOUS at 11:01

## 2022-06-16 RX ADMIN — PHENYLEPHRINE HYDROCHLORIDE 100 MCG: 0.1 INJECTION, SOLUTION INTRAVENOUS at 11:16

## 2022-06-16 RX ADMIN — Medication 10 MG: at 10:45

## 2022-06-16 RX ADMIN — SODIUM CHLORIDE, PRESERVATIVE FREE 10 ML: 5 INJECTION INTRAVENOUS at 21:19

## 2022-06-16 RX ADMIN — Medication 10 MG: at 11:34

## 2022-06-16 RX ADMIN — VANCOMYCIN HYDROCHLORIDE 2000 MG: 10 INJECTION, POWDER, LYOPHILIZED, FOR SOLUTION INTRAVENOUS at 09:25

## 2022-06-16 RX ADMIN — MEPIVACAINE HYDROCHLORIDE 50 MG: 20 INJECTION, SOLUTION EPIDURAL; INFILTRATION at 10:29

## 2022-06-16 RX ADMIN — TRAZODONE HYDROCHLORIDE 50 MG: 50 TABLET ORAL at 21:19

## 2022-06-16 RX ADMIN — ACETAMINOPHEN 1000 MG: 500 TABLET, FILM COATED ORAL at 09:04

## 2022-06-16 RX ADMIN — OXYCODONE 5 MG: 5 TABLET ORAL at 13:02

## 2022-06-16 RX ADMIN — Medication 10 MG: at 10:43

## 2022-06-16 RX ADMIN — SODIUM CHLORIDE, SODIUM LACTATE, POTASSIUM CHLORIDE, AND CALCIUM CHLORIDE: 600; 310; 30; 20 INJECTION, SOLUTION INTRAVENOUS at 09:27

## 2022-06-16 RX ADMIN — INSULIN LISPRO 10 UNITS: 100 INJECTION, SOLUTION INTRAVENOUS; SUBCUTANEOUS at 22:36

## 2022-06-16 RX ADMIN — ROPIVACAINE HYDROCHLORIDE 20 ML: 2 INJECTION, SOLUTION EPIDURAL; INFILTRATION at 10:11

## 2022-06-16 RX ADMIN — Medication 3000 MG: at 17:00

## 2022-06-16 RX ADMIN — Medication 10 MG: at 11:22

## 2022-06-16 RX ADMIN — INSULIN LISPRO 1 UNITS: 100 INJECTION, SOLUTION INTRAVENOUS; SUBCUTANEOUS at 16:53

## 2022-06-16 RX ADMIN — OXYCODONE 10 MG: 5 TABLET ORAL at 13:41

## 2022-06-16 RX ADMIN — OXYCODONE 10 MG: 5 TABLET ORAL at 17:31

## 2022-06-16 RX ADMIN — PHENYLEPHRINE HYDROCHLORIDE 100 MCG: 0.1 INJECTION, SOLUTION INTRAVENOUS at 11:55

## 2022-06-16 RX ADMIN — MIDAZOLAM 2 MG: 1 INJECTION INTRAMUSCULAR; INTRAVENOUS at 10:25

## 2022-06-16 RX ADMIN — ASPIRIN 81 MG: 81 TABLET, COATED ORAL at 21:19

## 2022-06-16 RX ADMIN — TRANEXAMIC ACID 1000 MG: 100 INJECTION, SOLUTION INTRAVENOUS at 10:32

## 2022-06-16 RX ADMIN — SODIUM CHLORIDE, SODIUM LACTATE, POTASSIUM CHLORIDE, AND CALCIUM CHLORIDE: 600; 310; 30; 20 INJECTION, SOLUTION INTRAVENOUS at 11:48

## 2022-06-16 RX ADMIN — Medication 10 MG: at 10:56

## 2022-06-16 RX ADMIN — PROPOFOL 75 MCG/KG/MIN: 10 INJECTION, EMULSION INTRAVENOUS at 10:36

## 2022-06-16 RX ADMIN — DEXAMETHASONE SODIUM PHOSPHATE 10 MG: 10 INJECTION INTRAMUSCULAR; INTRAVENOUS at 11:01

## 2022-06-16 RX ADMIN — PHENYLEPHRINE HYDROCHLORIDE 100 MCG: 0.1 INJECTION, SOLUTION INTRAVENOUS at 10:58

## 2022-06-16 RX ADMIN — ACETAMINOPHEN 650 MG: 325 TABLET, FILM COATED ORAL at 17:00

## 2022-06-16 RX ADMIN — PHENYLEPHRINE HYDROCHLORIDE 100 MCG: 0.1 INJECTION, SOLUTION INTRAVENOUS at 11:38

## 2022-06-16 RX ADMIN — Medication 3000 MG: at 10:20

## 2022-06-16 RX ADMIN — INSULIN GLARGINE 80 UNITS: 100 INJECTION, SOLUTION SUBCUTANEOUS at 16:53

## 2022-06-16 RX ADMIN — PROPOFOL 100 MG: 10 INJECTION, EMULSION INTRAVENOUS at 10:56

## 2022-06-16 RX ADMIN — DOCUSATE SODIUM 50MG AND SENNOSIDES 8.6MG 1 TABLET: 8.6; 5 TABLET, FILM COATED ORAL at 21:19

## 2022-06-16 RX ADMIN — MIDAZOLAM 2 MG: 1 INJECTION INTRAMUSCULAR; INTRAVENOUS at 10:09

## 2022-06-16 ASSESSMENT — KOOS JR
BENDING TO THE FLOOR TO PICK UP OBJECT: 2
STRAIGHTENING KNEE FULLY: 2
RISING FROM SITTING: 2
KOOS JR TOTAL INTERVAL SCORE: 52.465
HOW SEVERE IS YOUR KNEE STIFFNESS AFTER FIRST WAKING IN MORNING: 2
GOING UP OR DOWN STAIRS: 2
TWISING OR PIVOTING ON KNEE: 2
STANDING UPRIGHT: 2

## 2022-06-16 ASSESSMENT — PAIN SCALES - GENERAL
PAINLEVEL_OUTOF10: 7
PAINLEVEL_OUTOF10: 3
PAINLEVEL_OUTOF10: 7
PAINLEVEL_OUTOF10: 3
PAINLEVEL_OUTOF10: 3
PAINLEVEL_OUTOF10: 7
PAINLEVEL_OUTOF10: 2

## 2022-06-16 ASSESSMENT — PAIN DESCRIPTION - LOCATION
LOCATION: KNEE
LOCATION: KNEE

## 2022-06-16 ASSESSMENT — PAIN - FUNCTIONAL ASSESSMENT
PAIN_FUNCTIONAL_ASSESSMENT: 0-10
PAIN_FUNCTIONAL_ASSESSMENT: ACTIVITIES ARE NOT PREVENTED

## 2022-06-16 ASSESSMENT — LIFESTYLE VARIABLES: SMOKING_STATUS: 0

## 2022-06-16 ASSESSMENT — PAIN DESCRIPTION - DESCRIPTORS
DESCRIPTORS: ACHING

## 2022-06-16 ASSESSMENT — PAIN DESCRIPTION - ORIENTATION
ORIENTATION: RIGHT
ORIENTATION: RIGHT

## 2022-06-16 NOTE — ANESTHESIA PROCEDURE NOTES
Spinal Block    Patient location during procedure: OR  End time: 6/16/2022 10:33 AM  Reason for block: primary anesthetic  Staffing  Performed: anesthesiologist   Anesthesiologist: Emelina Ortega MD  Spinal Block  Patient position: sitting  Prep: ChloraPrep  Patient monitoring: cardiac monitor, continuous pulse ox, frequent blood pressure checks and oxygen  Approach: midline  Location: L4/L5  Provider prep: mask and sterile gloves  Needle  Needle type: pencil-tip   Needle gauge: 25 G  Needle length: 3.5 in  Assessment  Swirl obtained: Yes  CSF: clear  Attempts: 1  Hemodynamics: stable  Additional Notes  Time out 1033  Preanesthetic Checklist  Completed: patient identified, IV checked, risks and benefits discussed, surgical/procedural consents, equipment checked, pre-op evaluation, timeout performed, anesthesia consent given, oxygen available and monitors applied/VS acknowledged

## 2022-06-16 NOTE — PROGRESS NOTES
OCCUPATIONAL THERAPY Initial Assessment, Daily Note and PM      (Link to Caseload Tracking: OT Visit Days: 1  OT Orders   Time  OT Charge Capture  Rehab Caseload Tracker  Episode     Naye Dyson is a 64 y.o. female   PRIMARY DIAGNOSIS: Arthritis of right knee  Primary osteoarthritis of right knee [M17.11]  Arthritis of right knee [M17.11]  Procedure(s) (LRB):  RIGHT KNEE TOTAL ARTHROPLASTY ROBOTIC/NEPTALI EDGARDO (Right)  Day of Surgery  Reason for Referral: Pain in Right Knee (M25.561)  Stiffness of Right Knee, Not elsewhere classified (M25.661)  Inpatient: Payor: UMR / Plan: UMR  / Product Type: *No Product type* /     ASSESSMENT:     REHAB RECOMMENDATIONS:   Recommendation to date pending progress:  [address] [place] [address]    Equipment:     To Be Determined     ASSESSMENT:  Ms. Yolande Gregory is s/p right TKA and presents with decreased independence with functional mobility and activities of daily living as compared to baseline level of function and safety. Patient would benefit from skilled Occupational Therapy to maximize independence and safety with self-care task and functional mobility. Patient able to completed self care task dressing at edge of bed with minimal assist assist.  Mobilized from hospital bed around room and back to recliner/chair using a rolling walker with assist. Patient is hopeful to spend the night to better prepare for safe discharge home.        [address] [facility] [address] [address] [facility] 6 Clicks Daily Activity Inpatient Short Form:    AM-PAC Daily Activity Inpatient   How much help for putting on and taking off regular lower body clothing?: A Lot  How much help for Bathing?: A Lot  How much help for Toileting?: A Little  How much help for putting on and taking off regular upper body clothing?: None  How much help for taking care of personal grooming?: None  How much help for eating meals?: None  AM-PAC Inpatient Daily Activity Raw Score: 19  AM-PAC Inpatient ADL T-Scale Score : 40.22  ADL Inpatient CMS 0-100% Score: 42.8  ADL Inpatient CMS G-Code Modifier : CK     SUBJECTIVE:     Ms. Radha Navas states, \"This isn't as bad as I thought it would be\"       Social/Functional   Lives With: Spouse  Type of Home: House  Home Layout: One level  Home Access: Level entry    OBJECTIVE:     Suzan Palmer / Chacho Monzon / India Muan: None    RESTRICTIONS/PRECAUTIONS:  Restrictions/Precautions: Weight Bearing  Right Lower Extremity Weight Bearing: Weight Bearing As Tolerated    PAIN: VITALS / O2:   Pre Treatment:  Does not verbalize pain         Post Treatment: no change, resting in recliner Vitals          Oxygen        GROSS EVALUATION: INTACT IMPAIRED   (See Comments)   UE AROM [x] []   UE PROM [x] []   Strength [x]        Posture / Balance []  good sitting balance, RW for static and dynamic balance   Sensation [x]     Coordination [x]       Tone [x]       Edema [x]    Activity Tolerance []  decreased     Hand Dominance R [] L []      COGNITION/  PERCEPTION: INTACT IMPAIRED   (See Comments)   Orientation [x]     Vision [x]     Hearing [x]     Cognition  [x]     Perception [x]       MOBILITY: I Mod I S SBA CGA Min Mod Max Total  NT x2 Comments:   Bed Mobility    Rolling [] [] [] [] [] [] [] [] [] [] []    Supine to Sit [] [] [] [] [x] [] [] [] [] [] []    Scooting [] [] [] [] [x] [] [] [] [] [] []    Sit to Supine [] [] [] [] [] [] [] [] [] [] []    Transfers    Sit to Stand [] [] [] [] [x] [] [] [] [] [] []    Bed to Chair [] [] [] [] [x] [] [] [] [] [] []    Stand to Sit [] [] [] [] [x] [] [] [] [] [] []    Tub/Shower [] [] [] [] [] [] [] [] [] [] []       Toilet [] [] [] [] [] [] [] [] [] [] []        [] [] [] [] [] [] [] [] [] [] []    I=Independent, Mod I=Modified Independent, S=Supervision/Setup, SBA=Standby Assistance, CGA=Contact Guard Assistance, Min=Minimal Assistance, Mod=Moderate Assistance, Max=Maximal Assistance, Total=Total Assistance, NT=Not Tested    ACTIVITIES OF DAILY LIVING: I Mod I S SBA CGA Min Mod Max Total NT Comments   BASIC ADLs:              Upper Body Bathing [] [] [] [] [] [] [] [] [] []    Lower Body Bathing [] [] [] [] [] [] [] [] [] []    Toileting [] [] [] [] [] [] [] [] [] []    Upper Body Dressing [] [] [] [x] [] [] [] [] [] []    Lower Body Dressing [] [] [] [] [] [] [] [] [] []    Feeding [] [] [] [] [] [] [] [] [] []    Grooming [] [] [] [] [] [] [] [] [] []    Personal Device Care [] [] [] [] [] [] [] [] [] []    Functional Mobility [] [] [] [] [x] [] [] [] [] [] RW   I=Independent, Mod I=Modified Independent, S=Supervision/Setup, SBA=Standby Assistance, CGA=Contact Guard Assistance, Min=Minimal Assistance, Mod=Moderate Assistance, Max=Maximal Assistance, Total=Total Assistance, NT=Not Tested    PLAN:     FREQUENCY/DURATION     for duration of hospital stay or until stated goals are met, whichever comes first.    ACUTE OCCUPATIONAL THERAPY GOALS:   (Developed with and agreed upon by patient and/or caregiver.)    GOALS:   DISCHARGE GOALS (in preparation for going home/rehab):  3 days  1. Ms. Gisselle Philip will perform lower body dressing activity with minimal assist required to demonstrate improved functional mobility and safety. 2.  Ms. Gisselle Philip will perform bathing activity with minimal assist required to demonstrate improved functional mobility and safety. 3.  Ms. Gisselle Philip will perform toileting activity with  contact guard assist to demonstrate improved functional mobility and safety. 4.  Ms. Gisselle Philip will perform all functional transfers transfer with contact guard assist to demonstrate improved functional mobility and safety.       PROBLEM LIST:   (Skilled intervention is medically necessary to address:)  Decreased ADL/Functional Activities  Decreased Activity Tolerance  Decreased Balance  Decreased Coordination  Decreased Gait Ability  Decreased Strength  Decreased Transfer Abilities   INTERVENTIONS PLANNED:   (Benefits and precautions of occupational therapy have been discussed with the patient.)  Self Care Training  Therapeutic Activity  Therapeutic Exercise/HEP  Neuromuscular Re-education  Manual Therapy  Education         TREATMENT:     EVALUATION: LOW COMPLEXITY: (Untimed Charge)    TREATMENT:   Self Care (15 minutes): Patient participated in upper body dressing and lower body dressing ADLs in unsupported sitting and standing with minimal verbal, manual and tactile cueing to increase independence. Patient also participated in functional mobility, functional transfer and adaptive equipment training to increase independence.      AFTER TREATMENT PRECAUTIONS: Bed/Chair Locked, Call light within reach, Chair, Needs within reach, RN notified and Visitors at bedside    INTERDISCIPLINARY COLLABORATION:  RN/ PCT and PT/ PTA    EDUCATION:  Education Given To: Patient,Family  Education Provided: Role of Therapy,Plan of Care,Precautions,ADL Adaptive Strategies,Transfer Training,Energy Conservation,Fall Prevention Strategies,Equipment  Education Method: Demonstration,Verbal  Barriers to Learning: None  Education Outcome: Verbalized understanding,Demonstrated understanding,Continued education needed  [] Safe And Effective Hygiene  [x] Fall Precautions  [x] Hip Precautions  [] D/C Instruction Review [] Prosthesis Review  [x] Walker Management/Safety  [x] [employer] Equipment as Needed  [x] Therapeutic Resting Position of Joint       TOTAL TREATMENT DURATION AND TIME:  Time In: 9601  Time Out: 1143  Minutes: [address] [place], [place]

## 2022-06-16 NOTE — CONSULTS
[facility] Hospitalist Consult   Admit Date:  2022  8:13 AM   Name:  Fang Jackson   Age:  64 y.o. Sex:  female  :  1960   MRN:  [id number]   Room:  [facility]/01    Presenting Complaint: No chief complaint on file. Reason(s) for Admission: Primary osteoarthritis of right knee [M17.11]  Arthritis of right knee [M17.11]     Hospitalists consulted by Yamileth Deluca MD for: assistance with medical management    History of Presenting Illness:   Fang Jackson is a 64 y.o. female with history of HTN, HL, DM, obesity, HFpEG, fatty liver, Hashimoto's disease, IBD, KHAI, diabetic retinopathy who was admitted for right TKA. Seen at bedside with . In some pain but just received pain meds. Asking for something to eat otherwise no concerns. Denies CP, SOB, abdominal pain, vomiting. Review of Systems:  10 systems reviewed and negative except as noted in HPI.   Assessment & Plan:     Patient Active Problem List   Diagnosis    Hyperlipidemia, mixed    Closed compression fracture of thoracic vertebra (HCC)    Osteoporosis    Diabetic retinopathy associated with type 2 diabetes mellitus ([address]    [facility] kidney injury [facility]    Hypothyroidism    Acute heart failure with preserved ejection fraction (HCC)    Obesity, morbid (HCC)    Bilateral edema of lower extremity    Difficulty with CPAP nasal mask use    HAGAN (dyspnea on exertion)    Essential hypertension    KHAI on CPAP    Diabetic neuropathy (HCC)    Depression    Arthritis of right knee     S/p R TKA  - management per primary team    Hashimoto's  - continue synthroid    HFpEF  - continue ASA  - continue torsemide and spironolactone    DM  - continue glucotrol, lantus, humalog  - per patient having difficulties management glucose as an outpatient; advised on the importance of post-op wound healing and need for adequate glucose control;   - will place order to inpatient diabetic management team    Discharge Planning:     - status: Never Smoker    Smokeless tobacco: Never Used   Substance Use Topics    Alcohol use: Not Currently      Family History   Problem Relation Age of Onset    Cancer Sister     Other Mother         ALS   [place] COPD Father     Diabetes Sister     Hypertension Mother     Asthma Sister     Heart Disease Father       Family history reviewed and negative except as otherwise noted.     Immunization History   Administered Date(s) Administered    COVID-19, Moderna, Primary or Immunocompromised, PF, 100mcg/0.5mL 03/20/2021, 04/18/2021     Current Facility-Administered Medications   Medication Dose Route Frequency    dicyclomine (BENTYL) capsule 10 mg  10 mg Oral PRN    glipiZIDE (GLUCOTROL XL) extended release tablet 10 mg  10 mg Oral Daily    insulin aspart (NovoLOG) injection pen 1 Units  1 Units SubCUTAneous TID AC    Insulin Glargine (2 Unit Dial) SOPN 80 Units  80 Units SubCUTAneous BID    levothyroxine (SYNTHROID) tablet 100 mcg  100 mcg Oral Daily    losartan (COZAAR) tablet 100 mg  100 mg Oral Daily    pantoprazole (PROTONIX) tablet 40 mg  40 mg Oral Daily    spironolactone (ALDACTONE) tablet 25 mg  25 mg Oral Daily    torsemide (DEMADEX) tablet 20 mg  1 tablet Oral Daily    traZODone (DESYREL) tablet 50 mg  50 mg Oral Nightly PRN    0.9 % sodium chloride infusion   IntraVENous Continuous    sodium chloride flush 0.9 % injection 5-40 mL  5-40 mL IntraVENous 2 times per day    sodium chloride flush 0.9 % injection 5-40 mL  5-40 mL IntraVENous PRN    0.9 % sodium chloride infusion   IntraVENous PRN    acetaminophen (TYLENOL) tablet 650 mg  650 mg Oral Q6H    oxyCODONE (ROXICODONE) immediate release tablet 5 mg  5 mg Oral Q4H PRN    Or    oxyCODONE (ROXICODONE) immediate release tablet 10 mg  10 mg Oral Q4H PRN    HYDROmorphone HCl PF (DILAUDID) injection 0.5 mg  0.5 mg IntraVENous Q3H PRN    Or    HYDROmorphone HCl PF (DILAUDID) injection 1 mg  1 mg IntraVENous Q3H PRN    ceFAZolin (ANCEF) 3000 mg in sterile water 30 mL IV syringe  3,000 mg IntraVENous Q8H    promethazine (PHENERGAN) tablet 25 mg  25 mg Oral Q6H PRN    Or    ondansetron (ZOFRAN) injection 4 mg  4 mg IntraVENous Q6H PRN    sennosides-docusate sodium (SENOKOT-S) 8.6-50 MG tablet 1 tablet  1 tablet Oral BID    aspirin EC tablet 81 mg  81 mg Oral BID    aluminum & magnesium hydroxide-simethicone (MAALOX) 200-200-20 MG/5ML suspension 15 mL  15 mL Oral Q6H PRN    diphenhydrAMINE (BENADRYL) capsule 25 mg  25 mg Oral Q6H PRN    Or    diphenhydrAMINE (BENADRYL) injection 25 mg  25 mg IntraVENous Q6H PRN    naloxone (NARCAN) injection 0.4 mg  0.4 mg IntraVENous PRN    methocarbamol (ROBAXIN) tablet 750 mg  750 mg Oral 4x Daily PRN       Objective:     Patient Vitals for the past 24 hrs:   Temp Pulse Resp BP SpO2   06/16/22 1424 -- 90 -- -- 91 %   06/16/22 1302 -- 81 16 (!) 178/66 98 %   06/16/22 1247 -- 84 15 (!) 144/64 99 %   06/16/22 1242 -- 80 16 (!) 135/58 100 %   06/16/22 1237 -- 74 16 (!) 144/62 100 %   06/16/22 1232 98.5 °F (36.9 °C) 75 16 (!) 133/57 96 %   06/16/22 1014 -- 69 16 (!) 144/73 98 %   06/16/22 1008 -- 65 16 135/77 98 %   06/16/22 0854 97.9 °F (36.6 °C) 72 18 (!) 152/68 96 %     Estimated body mass index is 48.06 kg/m² as calculated from the following:    Height as of 6/7/22: 5' 4\" (1.626 m). Weight as of this encounter: 280 lb (127 kg). Intake/Output Summary (Last 24 hours) at 6/16/2022 1429  Last data filed at 6/16/2022 1234  Gross per 24 hour   Intake 1100 ml   Output 150 ml   Net 950 ml         Physical Exam:  Blood pressure (!) 178/66, pulse 90, temperature 98.5 °F (36.9 °C), temperature source Oral, resp. rate 16, weight 280 lb (127 kg), SpO2 91 %. General:    Well nourished. No overt distress. Morbidly obese F laying in bed in NAD  Head:  Normocephalic, atraumatic  Eyes:  Sclerae appear normal.  Pupils equally round. ENT:  Nares appear normal, no drainage. Moist oral mucosa  Neck:  No restricted ROM. Trachea midline   CV:   RRR. No m/r/g. No jugular venous distension. Lungs:   CTAB. No wheezing, rhonchi, or rales. Respirations even, unlabored  Abdomen: Bowel sounds present. Soft, nontender, nondistended. Extremities: No cyanosis or clubbing. No edema. R knee wrapped without e/o bleeding or drainage  Skin:     No rashes and normal coloration. Warm and dry. Neuro:  CN II-XII grossly intact. Sensation intact. A&Ox3  Psych:  Normal mood and affect.       I have reviewed ordered lab tests and independently visualized imaging below:    Recent Labs:  Recent Results (from the past 48 hour(s))   POCT Glucose    Collection Time: 06/16/22  9:21 AM   Result Value Ref Range    POC Glucose 126 (H) 65 - 100 mg/dL    Performed by: Perfecto    POCT Glucose    Collection Time: 06/16/22 12:54 PM   Result Value Ref Range    POC Glucose 91 65 - 100 mg/dL    Performed by: Tyler        Signed:  NELSON Blair

## 2022-06-16 NOTE — PROGRESS NOTES
06/16/22 1424   Oxygen Therapy/Pulse Ox   O2 Therapy Room air   O2 Device None (Room air)   Heart Rate 90   SpO2 91 %   Patient achieved  1250  MI/sec on IS. Patient encouraged to do 10 breaths every hour while awake-patient agreed and demonstrated. No shortness of breath or distress noted. BS are clear b/l. [facility] notes reviewed- patient has home CPAP. Water provided for humidity.

## 2022-06-16 NOTE — INTERVAL H&P NOTE
Update History & Physical    The patient's History and Physical of Roberta 10, 22 was reviewed with the patient and I examined the patient. There was no change. The surgical site was confirmed by the patient and me. Plan: The risks, benefits, expected outcome, and alternative to the recommended procedure have been discussed with the patient. Patient understands and wants to proceed with the procedure.      Electronically signed by NELSON Luke on 6/16/2022 at 9:49 AM

## 2022-06-16 NOTE — PERIOP NOTE
TRANSFER - OUT REPORT:    Verbal report given to receiving nurse Nakia(name) on Ronald Poles  being transferred to [facility](unit) for routine progression of patient care       Report consisted of patients Situation, Background, Assessment and   Recommendations(SBAR). Information from the following report(s) Adult Overview, Surgery Report and Intake/Output was reviewed with the receiving nurse. Opportunity for questions and clarification was provided.       Patient transported with:   [facility]

## 2022-06-16 NOTE — ANESTHESIA PRE PROCEDURE
Department of Anesthesiology  Preprocedure Note       Name:  Ramone Girard   Age:  64 y.o.  :  1960                                          MRN:  [id number]         Date:  2022      Surgeon: Enma Saravia):  Macy Ayala MD    Procedure: Procedure(s):  RIGHT KNEE TOTAL ARTHROPLASTY ROBOTIC/NEPTALI EDGARDO    Medications prior to admission:   Prior to Admission medications    Medication Sig Start Date End Date Taking? Authorizing Provider   glipiZIDE (GLUCOTROL XL) 10 MG extended release tablet Take 10 mg by mouth daily    Historical Provider, MD   ketorolac (TORADOL) 10 MG tablet Take 10 mg by mouth every 6 hours as needed for Pain    Historical Provider, MD   levothyroxine (SYNTHROID) 100 MCG tablet Take 100 mcg by mouth Daily    Historical Provider, MD   methocarbamol (ROBAXIN) 750 MG tablet Take 750 mg by mouth 4 times daily    Historical Provider, MD   Insulin Glargine, 2 Unit Dial, (TOUJEO MAX SOLOSTAR) 300 UNIT/ML SOPN Inject 80 Units into the skin in the morning and at bedtime    Historical Provider, MD   traMADol (ULTRAM) 50 MG tablet Take 50 mg by mouth every 6 hours as needed for Pain.     Historical Provider, MD   traZODone (DESYREL) 50 MG tablet Take 50 mg by mouth nightly as needed for Sleep    Historical Provider, MD   vitamin B-12 (CYANOCOBALAMIN) 1000 MCG tablet Take 1,000 mcg by mouth daily    Historical Provider, MD   acetaminophen (TYLENOL) 325 MG tablet Take 650 mg by mouth every 4 hours as needed    Ar Automatic Reconciliation   diclofenac (VOLTAREN) 50 MG EC tablet Take by mouth 2 times daily  Patient not taking: Reported on 2022    Ar Automatic Reconciliation   dicyclomine (BENTYL) 10 MG capsule Take 10 mg by mouth as needed    Ar Automatic Reconciliation   ergocalciferol (ERGOCALCIFEROL) 1.25 MG (64910 UT) capsule Take 50,000 Units by mouth Every Tuesday and Friday    Ar Automatic Reconciliation   insulin aspart (NOVOLOG) 100 UNIT/ML injection pen Inject 1 Units into the skin 3 times daily (before meals) Sliding scale    Ar Automatic Reconciliation   losartan (COZAAR) 100 MG tablet Take 100 mg by mouth daily    Ar Automatic Reconciliation   ondansetron (ZOFRAN) 8 MG tablet Take 8 mg by mouth every 8 hours as needed  Patient not taking: Reported on 6/16/2022    Ar Automatic Reconciliation   pantoprazole (PROTONIX) 40 MG tablet Take 40 mg by mouth daily    Ar Automatic Reconciliation   spironolactone (ALDACTONE) 25 MG tablet Take by mouth daily    Ar Automatic Reconciliation   torsemide (DEMADEX) 20 MG tablet TAKE 1 TABLET BY MOUTH EVERY DAY 8/13/21   Ar Automatic Reconciliation   traZODone (DESYREL) 50 MG tablet Take 50 mg by mouth nightly    Ar Automatic Reconciliation       Current medications:    Current Facility-Administered Medications   Medication Dose Route Frequency Provider Last Rate Last Admin    ceFAZolin (ANCEF) 3000 mg in sterile water 30 mL IV syringe  3,000 mg IntraVENous On Call to [address] [place] [address], [place]        tranexamic acid (CYKLOKAPRON) 1,000 mg in sodium chloride 0.9 % 100 mL IVPB  1,000 mg IntraVENous On Call to [address] [place] [address], [place]        lactated ringers infusion   IntraVENous Continuous Macy Ayala  mL/hr at 06/16/22 0927 New Bag at 06/16/22 0927    vancomycin (VANCOCIN) 2000 mg in 0.9% sodium chloride 500 mL IVPB  2,000 mg IntraVENous Once Macy Ayala  mL/hr at 06/16/22 0925 2,000 mg at 06/16/22 1124       Allergies:     Allergies   Allergen Reactions    Balsam [place]-Castor Oil      \"irritant'     Clobetasol      \"irritant\"     Cobalt Itching    Dapagliflozin Other (See Comments)     Yeast infection    Dulaglutide Other (See Comments)     Other reaction(s): Nausea and/or vomiting-Intolerance    Ezetimibe Other (See Comments)    Linalool      \"irritant\"     Liraglutide Other (See Comments)    Lisinopril Other (See Comments)     Heaviness in extremities, cough    Metformin Hcl Other (See Comments)    Erythromycin Nausea And Vomiting and Other (See Comments)     Severe GI upset       Problem List:    Patient Active Problem List   Diagnosis Code    Hyperlipidemia, mixed E78.2    Closed compression fracture of thoracic vertebra ([address] S22.000A    Osteoporosis M81.0    Diabetic retinopathy associated with type 2 diabetes mellitus ([address] E11.319    Acute kidney injury ([address] N17.9    Hypothyroidism E03.9    Acute heart failure with preserved ejection fraction (HCC) I50.31    Obesity, morbid (HCC) E66.01    Bilateral edema of lower extremity R60.0    Difficulty with CPAP nasal mask use Z78.9    HAGAN (dyspnea on exertion) R06.00    Essential hypertension I10    KHAI on CPAP G47.33, Z99.89    Diabetic neuropathy ([address] E11.40    Depression F32. A    Arthritis of right knee M17.11       Past Medical History:        Diagnosis Date    IRINA (acute kidney injury) ([address] 11/2020    Arthritis     back    Congestive heart failure ([facility] 09/2020    per cardio note (8/19/21) \"shows normal LV function, grade I diastolic dysfunction and no significant valvular abnormalities. \"    Diabetes mellitus ([address]     insulin, -110, S&s hypoglycemia BS 60's, 10/2020 A1c 7.6    Fatty liver     GERD (gastroesophageal reflux disease)     medication    Hashimoto's disease     History of MRSA infection 2020    Hypertension     controlled with meds    Liver disease     CROW (nonalcoholic steatohepatitis)     Psychiatric disorder     anxiety and depression    Renal insufficiency     per pt secondary to diabetes, followed by [facility]    Sleep apnea 02/2021    cpap at hs    Thyroid disease        Past Surgical History:        Procedure Laterality Date    CHOLECYSTECTOMY      COLONOSCOPY N/A 4/9/2021    COLONOSCOPY/ 50 performed by Amos Golden MD at [facility] ([address] [place] [address])      ORTHOPEDIC SURGERY Right 2017    x 4 surgeries on ankles    ORTHOPEDIC SURGERY Left 2020    x 2 surgeries on ankles    REFRACTIVE SURGERY      TONSILLECTOMY         Social History:    Social History     Tobacco Use    Smoking status: Never Smoker    Smokeless tobacco: Never Used   Substance Use Topics    Alcohol use: Not Currently                                Counseling given: Not Answered      Vital Signs (Current):   Vitals:    06/16/22 0854   BP: (!) 152/68   Pulse: 72   Resp: 18   Temp: 97.9 °F (36.6 °C)   TempSrc: Temporal   SpO2: 96%   Weight: 280 lb (127 kg)                                              BP Readings from Last 3 Encounters:   06/16/22 (!) 152/68   06/07/22 (!) 155/75   06/25/21 120/80       NPO Status: Time of last liquid consumption: 2200 (sips water with meds)                        Time of last solid consumption: 2200                        Date of last liquid consumption: 06/15/22                        Date of last solid food consumption: 06/15/22    BMI:   Wt Readings from Last 3 Encounters:   06/16/22 280 lb (127 kg)   06/07/22 283 lb (128.4 kg)   02/16/22 283 lb (128.4 kg)     Body mass index is 48.06 kg/m².     CBC:   Lab Results   Component Value Date    WBC 6.9 06/07/2022    RBC 4.80 06/07/2022    HGB 13.8 06/07/2022    HCT 43.6 06/07/2022    MCV 90.8 06/07/2022    RDW 13.7 06/07/2022     06/07/2022       CMP:   Lab Results   Component Value Date     06/07/2022    K 3.9 06/07/2022     06/07/2022    CO2 33 06/07/2022    BUN 20 06/07/2022    CREATININE 1.08 06/07/2022    GFRAA >60 06/07/2022    AGRATIO 0.7 05/14/2022    LABGLOM 55 06/07/2022    GLUCOSE 102 06/07/2022    PROT 8.5 05/14/2022    CALCIUM 10.3 06/07/2022    BILITOT 0.4 05/14/2022    ALKPHOS 73 05/14/2022    AST 69 05/14/2022    ALT 59 05/14/2022       POC Tests:   Recent Labs     06/16/22  0921   POCGLU 126*       Coags:   Lab Results   Component Value Date    PROTIME 13.6 06/07/2022    INR 1.0 06/07/2022    APTT 25.2 06/07/2022       HCG (If Applicable): No results found for: PREGTESTUR, PREGSERUM, HCG, HCGQUANT     ABGs: No results found for: PHART, PO2ART, MTM0UIA, ULK3IQC, BEART, A8MMFFML     Type & Screen (If Applicable):  No results found for: LABABO, LABRH    Drug/Infectious Status (If Applicable):  No results found for: HIV, HEPCAB    COVID-19 Screening (If Applicable):   Lab Results   Component Value Date    COVID19 Performed 04/02/2021    COVID19 Not Detected 04/02/2021           Anesthesia Evaluation  Patient summary reviewed and Nursing notes reviewed no history of anesthetic complications:   Airway: Mallampati: II  TM distance: >3 FB   Neck ROM: full  Mouth opening: > = 3 FB   Dental:    (+) partials      Pulmonary:normal exam    (+) sleep apnea: on CPAP,      (-) not a current smoker                           Cardiovascular:  Exercise tolerance: good (>4 METS),   (+) hypertension: mild, CHF (Improved with diuresis): no interval change, HAGAN:, hyperlipidemia        Rhythm: regular  Rate: normal                 ROS comment: 8/2021:  · Normal chamber sizes. · The left ventricular systolic function is normal (55-65%). · Grade I (mild) left ventricular diastolic dysfunction present,   consistent with impaired relaxation. · The right ventricular systolic function is normal       Neuro/Psych:   Negative Neuro/Psych ROS  (+) depression/anxiety             GI/Hepatic/Renal:   (+) GERD: well controlled, liver disease (CROW):,           Endo/Other:    (+) DiabetesType II DM, using insulin, hypothyroidism: arthritis:., .                 Abdominal:             Vascular: Other Findings:           Anesthesia Plan      spinal     ASA 3       Induction: intravenous. MIPS: Postoperative opioids intended and Prophylactic antiemetics administered. Anesthetic plan and risks discussed with patient. Use of blood products discussed with whom consented to blood products.            Post-op pain plan if not by surgeon: single peripheral nerve block            Ellie Leyva MD   6/16/2022

## 2022-06-16 NOTE — ANESTHESIA PROCEDURE NOTES
Peripheral Block    Patient location during procedure: pre-op  Reason for block: post-op pain management and at surgeon's request  Start time: 6/16/2022 10:11 AM  End time: 6/16/2022 10:14 AM  Staffing  Performed: anesthesiologist   Anesthesiologist: Abiel Aden MD  Preanesthetic Checklist  Completed: patient identified, IV checked, site marked, risks and benefits discussed, surgical/procedural consents, equipment checked, pre-op evaluation, timeout performed, anesthesia consent given, oxygen available and monitors applied/VS acknowledged  Peripheral Block   Patient position: supine  Prep: ChloraPrep  Provider prep: mask and sterile gloves  Patient monitoring: cardiac monitor, continuous pulse ox, frequent blood pressure checks, IV access, oxygen and responsive to questions  Block type: Femoral  Adductor canal  Laterality: right  Injection technique: single-shot  Guidance: ultrasound guided  Local infiltration: lidocaine  Infiltration strength: 1 %  Local infiltration: lidocaine  Dose: 1 mL    Needle   Needle type: insulated echogenic nerve stimulator needle   Needle gauge: 21 G  Needle localization: anatomical landmarks and ultrasound guidance  Needle length: 8 cm  Assessment   Injection assessment: negative aspiration for heme, no paresthesia on injection, local visualized surrounding nerve on ultrasound and no intravascular symptoms  Hemodynamics: stable  Real-time US image taken/store: yes  Outcomes: uncomplicated    Additional Notes  Time out at 1011    Local anesthetic was visualized surrounding the nerve/block plane under real time ultrasound guidance. An image was obtained and placed on the chart. The relevant block area was scanned before, during, and after the local anesthetic injection and no gross abnormalities were observed.    Medications Administered  Ropivacaine (NAROPIN) injection 0.2%, 20 mL

## 2022-06-16 NOTE — OP NOTE
[facility] Cemented Total Knee Arthroplasty -   Patient:Millie Mason   : 1960  Medical Record SBPNN  Pre-operative Diagnosis:  Primary osteoarthritis of right knee [M17.11]  Arthritis of right knee [M17.11]  Post-operative Diagnosis: Primary osteoarthritis of right knee [M17.11]  Arthritis of right knee [M17.11]    Surgeon: Sharon Humphrey MD  Assistant: Nitish Robledo PA-C    Anesthesia: Spinal    Procedure: Total Knee Arthroplasty   The complexity of the total joint surgery requires the use of a first assistant for positioning, retraction and assistance in closure. The patient's Body mass index is 48.06 kg/m²., BMI's greater then 40 make surgical exposure and retraction extremely difficult and increase operative time. Tourniquet Time: none  EBL: 150cc  Additional Findings: Large foci of grade 3-4 CM in all three compartments  Releases none    Mynor Traore was brought to the operating room and positioned on the operating table. She was anethestized  IV antibiotics were administered per CMS protocol. Prior to the incision being made a timeout was called identifying the patient, procedure ,operative side and surgeon. The right leg was prepped and draped in the usual sterile manner  An anterior longitudinal incision was accomplished just medial to the tibial tubercle and extending approximal 6 centimeters proximal to the superior pole of the patella. A medial parapatellar capsular incision was performed. The medial capsular flap was elevated around to the insertion of the semimembranous tendon. The patella was everted and the knee flexed and externally rotated. The medial and external menisci were excised. The lateral half of the fat pad excised and the patella femoral ligament was released. The anterior cruciate ligament was resected and the posterior cruciate ligament was retained.     The Avinash arrays were placed in the distal femur and proximal tibia per protocol and the knee was registered. Confirmation of registration with the pre-op CT scan and surgical plan was made. The knee was balanced with tensioners as part of this process. The tibia and femur were then prepared via the [facility] system with robotic assistance. A preliminary range of motion was accomplished with the above size trial components. A polyethylene insert allowed the patient to obtain full extension as well as appropriate flexion. The patient's ligaments were stable in flexion and extension to medial and lateral stressing and the alignment was through the appropriate mechanical axis. The tibial base plate was pinned into place with the appropriate external rotation and stem site prepared. The patella was then everted. Given grade 3-4 chondromalacia, the patella was resurfaced with a cemented all-poly patella. All trial components were removed and the implants were cemented into position and pressurized per routine. .    The Irrisept lavage protocol was performed. The operative knee was injected with 60cc of Naropin, 10 cc's of morphine and 1 cc of 30mg of Toradol. The capsular layer was closed using a #1 vicryl suture and a running +1 Stratofix, while subcutaneous layers were closed using 2-0 Vicryl interrupted sutures and a #1 Stratofix. Finally the skin was closed using 3-0 Vicryl and a Zipline closure. A sterile sterile bandage was applied. An Iceman cryo pad was applied on the operative leg. Sponge count and needle counts were correct. Juliana Riojas left the operating room     Implants:   Implant Name Type Inv.  Item Serial No.  Lot No. LRB No. Used Action   CEMENT BONE 40GM HI VISC PALACOS R - [id number]  CEMENT BONE 40GM HI VISC PALACOS R  [facility]- [id number] Right 2 Implanted   COMPONENT FEM SZ 3 R KNEE CRUCE RET ANGELIQUE TRIATHLON - [id number]  COMPONENT FEM SZ 3 R KNEE CRUCE RET ANGELIQUE TRIATHLON  [facility] [employer] [facility]- [id number] Right 1 Implanted   BASEPLATE TIB SZ 3 UNIV KNEE TRITANIUM TOT STBL ANGELIQUE - [id number]  BASEPLATE TIB SZ 3 UNIV KNEE TRITANIUM TOT STBL ANGELIQUE  [employer]- [id number] Right 1 Implanted   COMPONENT PAT [id number] THK9MM SUP INFERIOR KNEE CONVENTIONAL - [id number]  COMPONENT PAT MTN79LY THK9MM SUP INFERIOR KNEE CONVENTIONAL  [employer]- [id number] Right 1 Implanted   INSERT TIB CS 3 10 MM ARTC KNEE [employer] TRIATHLON - [id number]  INSERT TIB CS 3 10 MM ARTC KNEE BEAR TECHNOLOGY TRIATHLON  [facility] [employer]TH[employer]ICS HOW- [id number] Right 1 Implanted     Signed By: Betty Fontanez MD

## 2022-06-16 NOTE — PROGRESS NOTES
PHYSICAL THERAPY JOINT CAMP: TOTAL KNEE ARTHROPLASTY Initial Assessment and PM  (Link to Caseload Tracking: PT Visit Days : 1  Acknowledge Orders  Time In/Out  PT Charge Capture  Rehab Caseload Tracker  Episode   Ronald Feldman is a 64 y.o. female   PRIMARY DIAGNOSIS: Arthritis of right knee  Primary osteoarthritis of right knee [M17.11]  Arthritis of right knee [M17.11]  Procedure(s) (LRB):  RIGHT KNEE TOTAL ARTHROPLASTY ROBOTIC/NEPTALI EDGARDO (Right)  Day of Surgery  Reason for Referral: Pain in Left Knee (M25.562)  Stiffness of Left Knee, Not elsewhere classified (M25.662)  Difficulty in walking, Not elsewhere classified (R26.2)  Inpatient: Payor: UMR / Plan: UMR  / Product Type: *No Product type* /     REHAB RECOMMENDATIONS:   Recommendation to date pending progress:  [address] [place] [address]    Equipment:     To Be Determined     RANGE OF MOTION:   Right Knee Flexion: R Knee Flexion 0-145: 75 (approximate)  Right Knee Extension: R Knee Extension 0: 0 (approximate)     GAIT: I Mod I S SBA CGA Min Mod Max Total  NT x2 Comments:   Level of Assistance [] [] [] [] [x] [] [] [] [] [] []            Weightbearing Status  Right Lower Extremity Weight Bearing: Weight Bearing As Tolerated    Distance  25 feet    Gait Quality Antalgic, Decreased step clearance, Decreased step length, Decreased stance and Step-to    DME Rolling Walker     Stairs      Ramp     I=Independent, Mod I=Modified Independent, S=Supervision, SBA=Standby Assistance, CGA=Contact Guard Assistance,   Min=Minimal Assistance, Mod=Moderate Assistance, Max=Maximal Assistance, Total=Total Assistance, NT=Not Tested    ASSESSMENT:   ASSESSMENT:  Ms. Shannan Hernandez presents with decreased strength and range of motion right lower extremity and with decreased independence with functional mobility s/p right total knee arthroplasty. Pt will benefit from skilled PT interventions to maximize independence with functional mobility and TKA management.  Pt [] [] [] [] [] [] [] []    Supine to Sit [] [] [] [x] [] [] [] [] [] [] []    Scooting [] [] [] [x] [] [] [] [] [] [] []    Sit to Supine [] [] [] [] [] [] [] [] [] [] []    Transfers    Sit to Stand [] [] [] [] [x] [] [] [] [] [] []    Bed to Chair [] [] [] [] [x] [] [] [] [] [] []    Stand to Sit [] [] [] [] [x] [] [] [] [] [] []    Stand Pivot [] [] [] [] [] [] [] [] [] [] []    Toilet [] [] [] [] [] [] [] [] [] [] []     [] [] [] [] [] [] [] [] [] [] []    I=Independent, Mod I=Modified Independent, S=Supervision, SBA=Standby Assistance, CGA=Contact Guard Assistance,   Min=Minimal Assistance, Mod=Moderate Assistance, Max=Maximal Assistance, Total=Total Assistance, NT=Not Tested    BALANCE: Good Fair+ Fair Fair- Poor NT Comments   Sitting Static [x] [] [] [] [] []    Sitting Dynamic [x] [] [] [] [] []              Standing Static [] [x] [] [] [] []    Standing Dynamic [] [x] [] [] [] []      PLAN:   ACUTE PHYSICAL THERAPY GOALS:   (Developed with and agreed upon by patient and/or caregiver.)  GOALS (1-4 days):  (1.)Ms. Clarke Tim will move from supine to sit and sit to supine  in bed with SUPERVISION. (2.)Ms. Clarke Tim will transfer from bed to chair and chair to bed with SUPERVISION using the least restrictive device. (3.)Ms. Clarke Tim will ambulate with SUPERVISION for 300 feet with the least restrictive device. (4.)Ms. Clarke Tim will ambulate up/down 3 steps with bilateral  railing with CONTACT GUARD ASSIST using no assistive device. (5.)Ms. Clarke Tim will increase right knee ROM to 0-90°.  ________________________________________________________________________________________________                FREQUENCY AND DURATION: BID for duration of hospital stay or until stated goals are met, whichever comes first.    THERAPY PROGNOSIS: Good    PROBLEM LIST:   (Skilled intervention is medically necessary to address:)  Decreased ADL/Functional Activities  Decreased Activity Tolerance  Decreased AROM/PROM  Decreased Gait Ability  Decreased Strength  Decreased Transfer Abilities   INTERVENTIONS PLANNED:   (Benefits and precautions of physical therapy have been discussed with the patient.)  Self Care Training  Therapeutic Activity  Therapeutic Exercise/HEP  Gait Training  Education       TREATMENT:   EVALUATION: LOW COMPLEXITY: (Untimed Charge)    TREATMENT:   Therapeutic Exercise (10 Minutes): Therapeutic exercises noted below to improve functional AROM, strength and mobility. TREATMENT GRID:  THERAPEUTIC  EXERCISES: DATE:  6/16 DATE:   DATE:      AM PM AM PM AM PM    [] [] [] [] [] []   Ankle Pumps  10       Quad Sets  10       Gluteal Sets  10       Hip Abd/ADduction  10       Straight Leg Raises  10       Knee Slides  10       Short Arc Quads  10       Chair Slides                           B = bilateral; AA = active assistive; A = active; P = passive      EDUCATION:    EDUCATION:  [x] Home Exercises  [x] Fall Precautions  [x] No Pillow Under Knee  [] D/C Instruction Review [x] Cryocuff  [] Walker Management/Safety  [] Adaptive Equipment as Needed     AFTER TREATMENT PRECAUTIONS: Bed/Chair Locked, Chair and Visitors at bedside    INTERDISCIPLINARY COLLABORATION:  RN/ PCT and OT/ BROWNE    COMPLIANCE WITH PROGRAM/EXERCISE: compliant all of the time, Will assess as treatment progresses. RECOMMENDATIONS/INTENT FOR NEXT TREATMENT SESSION: Treatment next visit will focus on increasing Ms. Barker's independence with bed mobility, transfers, gait training, strength/ROM exercises, modalities for pain, and patient education.      TIME IN/OUT:  Time In: 1505  Time Out: [address]  Minutes: [place], PT

## 2022-06-16 NOTE — ANESTHESIA POSTPROCEDURE EVALUATION
Department of Anesthesiology  Postprocedure Note    Patient: David Burdick  MRN: [id number]  YOB: 1960  Date of evaluation: 6/16/2022  Time:  3:52 PM     Procedure Summary     Date: 06/16/22 Room / Location: [address] MAIN OR    Anesthesia Start: 5918 Anesthesia Stop: 0173    Procedure: RIGHT KNEE TOTAL ARTHROPLASTY ROBOTIC/NEPTALI EDGARDO (Right Knee) Diagnosis:       Primary osteoarthritis of right knee      (Primary osteoarthritis of right knee [M17.11])    Surgeons: Janna Wilkins MD Responsible Provider: Angela Nieves MD    Anesthesia Type: spinal ASA Status: 3          Anesthesia Type: No value filed. Norma Phase I: Norma Score: 9    Norma Phase II:      Last vitals: Reviewed and per EMR flowsheets.        Anesthesia Post Evaluation    Patient location during evaluation: PACU  Patient participation: complete - patient participated  Level of consciousness: awake and alert  Airway patency: patent  Nausea & Vomiting: no nausea and no vomiting  Complications: no  Cardiovascular status: hemodynamically stable  Respiratory status: acceptable, nonlabored ventilation and spontaneous ventilation  Hydration status: euvolemic  Comments: /83   Pulse 81   Temp 98.5 °F (36.9 °C) (Oral)   Resp 20   Wt 280 lb (127 kg)   SpO2 93%   BMI 48.06 kg/m²     Required GA via LMA after spinal level to upper thoracic level made patient very uncomfortable with her breathing    Multimodal analgesia pain management approach

## 2022-06-17 VITALS
HEART RATE: 80 BPM | TEMPERATURE: 97.5 F | SYSTOLIC BLOOD PRESSURE: 120 MMHG | DIASTOLIC BLOOD PRESSURE: 57 MMHG | WEIGHT: 280 LBS | OXYGEN SATURATION: 93 % | BODY MASS INDEX: 48.06 KG/M2 | RESPIRATION RATE: 18 BRPM

## 2022-06-17 LAB
GLUCOSE BLD STRIP.AUTO-MCNC: 307 MG/DL (ref 65–100)
HCT VFR BLD AUTO: 39.8 % (ref 35.8–46.3)
HGB BLD-MCNC: 12.6 G/DL (ref 11.7–15.4)
SERVICE CMNT-IMP: ABNORMAL

## 2022-06-17 PROCEDURE — 85018 HEMOGLOBIN: CPT

## 2022-06-17 PROCEDURE — 36415 COLL VENOUS BLD VENIPUNCTURE: CPT

## 2022-06-17 PROCEDURE — 82962 GLUCOSE BLOOD TEST: CPT

## 2022-06-17 PROCEDURE — 6370000000 HC RX 637 (ALT 250 FOR IP): Performed by: FAMILY MEDICINE

## 2022-06-17 PROCEDURE — 97116 GAIT TRAINING THERAPY: CPT

## 2022-06-17 PROCEDURE — 97110 THERAPEUTIC EXERCISES: CPT

## 2022-06-17 PROCEDURE — 97535 SELF CARE MNGMENT TRAINING: CPT

## 2022-06-17 PROCEDURE — 6370000000 HC RX 637 (ALT 250 FOR IP): Performed by: PHYSICIAN ASSISTANT

## 2022-06-17 PROCEDURE — 6360000002 HC RX W HCPCS: Performed by: PHYSICIAN ASSISTANT

## 2022-06-17 PROCEDURE — 2580000003 HC RX 258: Performed by: PHYSICIAN ASSISTANT

## 2022-06-17 PROCEDURE — 2500000003 HC RX 250 WO HCPCS: Performed by: PHYSICIAN ASSISTANT

## 2022-06-17 RX ORDER — METHOCARBAMOL 750 MG/1
750 TABLET, FILM COATED ORAL 4 TIMES DAILY PRN
Qty: 40 TABLET | Refills: 0 | Status: SHIPPED | OUTPATIENT
Start: 2022-06-17 | End: 2022-06-27

## 2022-06-17 RX ORDER — OXYCODONE HYDROCHLORIDE 5 MG/1
5-10 TABLET ORAL EVERY 4 HOURS PRN
Qty: 60 TABLET | Refills: 0 | Status: SHIPPED | OUTPATIENT
Start: 2022-06-17 | End: 2022-06-24

## 2022-06-17 RX ORDER — ASPIRIN 81 MG/1
81 TABLET ORAL 2 TIMES DAILY
Qty: 60 TABLET | Refills: 0 | Status: SHIPPED | OUTPATIENT
Start: 2022-06-17 | End: 2022-07-17

## 2022-06-17 RX ADMIN — DOCUSATE SODIUM 50MG AND SENNOSIDES 8.6MG 1 TABLET: 8.6; 5 TABLET, FILM COATED ORAL at 09:56

## 2022-06-17 RX ADMIN — OXYCODONE 10 MG: 5 TABLET ORAL at 01:42

## 2022-06-17 RX ADMIN — ACETAMINOPHEN 650 MG: 325 TABLET, FILM COATED ORAL at 01:42

## 2022-06-17 RX ADMIN — TORSEMIDE 20 MG: 20 TABLET ORAL at 10:06

## 2022-06-17 RX ADMIN — PROMETHAZINE HYDROCHLORIDE 25 MG: 25 TABLET ORAL at 02:04

## 2022-06-17 RX ADMIN — GLIPIZIDE 10 MG: 5 TABLET ORAL at 09:57

## 2022-06-17 RX ADMIN — ASPIRIN 81 MG: 81 TABLET, COATED ORAL at 09:57

## 2022-06-17 RX ADMIN — SPIRONOLACTONE 25 MG: 25 TABLET ORAL at 09:57

## 2022-06-17 RX ADMIN — LEVOTHYROXINE SODIUM 100 MCG: 0.1 TABLET ORAL at 06:36

## 2022-06-17 RX ADMIN — PANTOPRAZOLE SODIUM 40 MG: 40 TABLET, DELAYED RELEASE ORAL at 09:57

## 2022-06-17 RX ADMIN — OXYCODONE 10 MG: 5 TABLET ORAL at 05:51

## 2022-06-17 RX ADMIN — Medication 3000 MG: at 01:53

## 2022-06-17 RX ADMIN — OXYCODONE 10 MG: 5 TABLET ORAL at 10:05

## 2022-06-17 RX ADMIN — SODIUM CHLORIDE, PRESERVATIVE FREE 10 ML: 5 INJECTION INTRAVENOUS at 09:58

## 2022-06-17 RX ADMIN — LOSARTAN POTASSIUM 100 MG: 50 TABLET, FILM COATED ORAL at 09:56

## 2022-06-17 RX ADMIN — INSULIN GLARGINE 80 UNITS: 100 INJECTION, SOLUTION SUBCUTANEOUS at 10:03

## 2022-06-17 RX ADMIN — ACETAMINOPHEN 650 MG: 325 TABLET, FILM COATED ORAL at 05:51

## 2022-06-17 ASSESSMENT — PAIN SCALES - GENERAL
PAINLEVEL_OUTOF10: 2
PAINLEVEL_OUTOF10: 7
PAINLEVEL_OUTOF10: 2
PAINLEVEL_OUTOF10: 7
PAINLEVEL_OUTOF10: 7

## 2022-06-17 ASSESSMENT — PAIN DESCRIPTION - LOCATION
LOCATION: KNEE
LOCATION: KNEE

## 2022-06-17 ASSESSMENT — PAIN - FUNCTIONAL ASSESSMENT
PAIN_FUNCTIONAL_ASSESSMENT: ACTIVITIES ARE NOT PREVENTED
PAIN_FUNCTIONAL_ASSESSMENT: ACTIVITIES ARE NOT PREVENTED

## 2022-06-17 ASSESSMENT — PAIN DESCRIPTION - DESCRIPTORS
DESCRIPTORS: ACHING
DESCRIPTORS: ACHING

## 2022-06-17 ASSESSMENT — PAIN DESCRIPTION - ORIENTATION
ORIENTATION: RIGHT
ORIENTATION: RIGHT

## 2022-06-17 NOTE — PROGRESS NOTES
PHYSICAL THERAPY JOINT CAMP: TOTAL KNEE ARTHROPLASTY Daily Note and AM  (Link to Caseload Tracking: PT Visit Days : 2  Acknowledge Orders  Time In/Out  PT Charge Capture  Rehab Caseload Tracker  Episode   Jessica Da Silva is a 64 y.o. female   PRIMARY DIAGNOSIS: Arthritis of right knee  Primary osteoarthritis of right knee [M17.11]  Arthritis of right knee [M17.11]  Procedure(s) (LRB):  RIGHT KNEE TOTAL ARTHROPLASTY ROBOTIC/NEPTALI EDGARDO (Right)  1 Day Post-Op  Reason for Referral: Pain in Left Knee (M25.562)  Stiffness of Left Knee, Not elsewhere classified (M25.662)  Difficulty in walking, Not elsewhere classified (R26.2)  Inpatient: Payor: UMR / Plan: UMR  / Product Type: *No Product type* /     REHAB RECOMMENDATIONS:   Recommendation to date pending progress:  [address] [place] [address]    Equipment:     To Be Determined     RANGE OF MOTION:   Right Knee Flexion: R Knee Flexion 0-145: 75 (approximate)  Right Knee Extension: R Knee Extension 0: 0 (approximate)     GAIT: I Mod I S SBA CGA Min Mod Max Total  NT x2 Comments:   Level of Assistance [] [] [] [x] [] [] [] [] [] [] []            Weightbearing Status  Right Lower Extremity Weight Bearing: Weight Bearing As Tolerated    Distance  200 feet    Gait Quality Antalgic, Decreased step clearance, Decreased step length, Decreased stance and Step-to    DME Rolling Walker     Stairs  pt does not have any so did not practice    Ramp     I=Independent, Mod I=Modified Independent, S=Supervision, SBA=Standby Assistance, CGA=Contact Guard Assistance,   Min=Minimal Assistance, Mod=Moderate Assistance, Max=Maximal Assistance, Total=Total Assistance, NT=Not Tested    ASSESSMENT:   ASSESSMENT:  Ms. Osbaldo Ingram presents with decreased strength and range of motion right lower extremity and with decreased independence with functional mobility s/p right total knee arthroplasty.  Pt will benefit from skilled PT interventions to maximize independence with functional mobility and TKA management. Pt did well with assessment. Today's treatment focused on transfer and gait training. Verbal cues and rolling walker in room, pt pleased to be up and moving. Pt practiced TKA exercises as below with verbal cues while reviewing HEP. Reviewed use of cryocuff as needed for pain and swelling. Pt instructed not to get up without assist. Pt plans to discharge to home from the hospital with continued therapy for follow up. Patient is hopeful to spend the night to better prepare for safe discharge home. 6/17- pt supine on contact and agreeable to therapy. Supine to sit with SBA, sit to stand and gait training with SBA. Pt walked 200 feet with RW and verbal cues, doing well with reciprocal gait pattern and making good progress with distance. In room in chair worked on TKA exercises with verbal cues progressing with repetitions. Reviewed HEP and frequency and use of ice. Pt plans to discharge to home today.       Outcome Measure:   KOOS-JR:  Jr CHANDNI. Knee Survey Score: 14    SUBJECTIVE:   Ms. Caridad Ramirez states, \"I am doing good\"     Social/Functional Lives With: Spouse  Type of Home: House  Home Layout: One level  Home Access: Level entry    OBJECTIVE:     PAIN: VITAL SIGNS: LINES/DRAINS:   Pre Treatment: no reports of pain         Post Treatment: sore, ice on knee Vitals        Oxygen    None    RESTRICTIONS/PRECAUTIONS:  Restrictions/Precautions: Weight Bearing  Required Braces or Orthoses?: No  Right Lower Extremity Weight Bearing: Weight Bearing As Tolerated        Restrictions/Precautions: Weight Bearing  Required Braces or Orthoses?: No        LOWER EXTREMITY GROSS EVALUATION:  RIGHT LE   Within Functional Limits   Abnormal   Comments   Strength [] []  generally decreased s/p right TKA   Range of Motion [] [] AROM RLE (degrees)  RLE AROM: Exceptions  R Knee Flexion 0-145: 75 (approximate)  R Knee Extension 0: 0 (approximate)      LEFT LE Within Functional Limits   Abnormal   Comments Strength [x] []     Range of Motion [x] []       UPPER EXTREMITY GROSS EVALUATION:     Within Functional Limits   Abnormal   Comments   Strength [x] []    Range of Motion [x] []      SENSATION  Sensation [x]       COGNITION/  PERCEPTION: Intact Impaired (Comments):   Orientation [x]     Vision [x]     Hearing [x]     Cognition  [x]       MOBILITY: I Mod I S SBA CGA Min Mod Max Total  NT x2 Comments:   Bed Mobility    Rolling [] [] [] [] [] [] [] [] [] [] []    Supine to Sit [] [] [] [x] [] [] [] [] [] [] []    Scooting [] [] [] [x] [] [] [] [] [] [] []    Sit to Supine [] [] [] [] [] [] [] [] [] [] []    Transfers    Sit to Stand [] [] [] [x] [] [] [] [] [] [] []    Bed to Chair [] [] [] [x] [] [] [] [] [] [] []    Stand to Sit [] [] [] [x] [] [] [] [] [] [] []    Stand Pivot [] [] [] [] [] [] [] [] [] [] []    Toilet [] [] [] [] [] [] [] [] [] [] []     [] [] [] [] [] [] [] [] [] [] []    I=Independent, Mod I=Modified Independent, S=Supervision, SBA=Standby Assistance, CGA=Contact Guard Assistance,   Min=Minimal Assistance, Mod=Moderate Assistance, Max=Maximal Assistance, Total=Total Assistance, NT=Not Tested    BALANCE: Good Fair+ Fair Fair- Poor NT Comments   Sitting Static [x] [] [] [] [] []    Sitting Dynamic [x] [] [] [] [] []              Standing Static [] [x] [] [] [] []    Standing Dynamic [] [x] [] [] [] []      PLAN:   ACUTE PHYSICAL THERAPY GOALS:   (Developed with and agreed upon by patient and/or caregiver.)  GOALS (1-4 days):  (1.)Ms. Vince Dumont will move from supine to sit and sit to supine  in bed with SUPERVISION. (2.)Ms. Clarke Tim will transfer from bed to chair and chair to bed with SUPERVISION using the least restrictive device. (3.)Ms. Clarke Tim will ambulate with SUPERVISION for 300 feet with the least restrictive device. (4.)Ms. Vince Tim will ambulate up/down 3 steps with bilateral  railing with CONTACT GUARD ASSIST using no assistive device. (5.)Ms.  Vince Tim will increase right knee ROM to 0-90°.  ________________________________________________________________________________________________      FREQUENCY AND DURATION: BID for duration of hospital stay or until stated goals are met, whichever comes first.    THERAPY PROGNOSIS: Good    PROBLEM LIST:   (Skilled intervention is medically necessary to address:)  Decreased ADL/Functional Activities  Decreased Activity Tolerance  Decreased AROM/PROM  Decreased Gait Ability  Decreased Strength  Decreased Transfer Abilities   INTERVENTIONS PLANNED:   (Benefits and precautions of physical therapy have been discussed with the patient.)  Self Care Training  Therapeutic Activity  Therapeutic Exercise/HEP  Gait Training  Education       TREATMENT:   EVALUATION: LOW COMPLEXITY: (Untimed Charge)    TREATMENT:   Therapeutic Exercise (15 Minutes): Therapeutic exercises noted below to improve functional AROM, strength and mobility. Gait Training (15 Minutes): Gait training for 200 feet utilizing [place] [employer]. Patient required Verbal cueing to improve Gait Mechanics.      TREATMENT GRID:  THERAPEUTIC  EXERCISES: DATE:  6/16 DATE:  6/17 DATE:      AM PM AM PM AM PM    [] [] [] [] [] []   Ankle Pumps  10 15      Quad Sets  10 15      Gluteal Sets  10 15      Hip Abd/ADduction  10 15      Straight Leg Raises  10 15      Knee Slides  10 15      Short Arc Quads  10 15      Chair Slides   15                        B = bilateral; AA = active assistive; A = active; P = passive      EDUCATION: Education Given To: Patient,Family  Education Provided: Role of Therapy,Plan of Care,Home Exercise Program  Education Outcome: Verbalized understanding,Demonstrated understanding  EDUCATION:  [x] Home Exercises  [x] Fall Precautions  [x] No Pillow Under Knee  [x] D/C Instruction Review [x] Cryocuff  [x] Walker Management/Safety  [] Adaptive Equipment as Needed     AFTER TREATMENT PRECAUTIONS: Bed/Chair Locked, Chair and Visitors at bedside    INTERDISCIPLINARY COLLABORATION:  RN/ PCT    COMPLIANCE WITH PROGRAM/EXERCISE: compliant all of the time. RECOMMENDATIONS/INTENT FOR NEXT TREATMENT SESSION: Treatment next visit will focus on increasing Ms. Barker's independence with bed mobility, transfers, gait training, strength/ROM exercises, modalities for pain, and patient education.      TIME IN/OUT:  Time In: 0900  Time Out: 0930  Minutes: 21 [facility],

## 2022-06-17 NOTE — PROGRESS NOTES
[] [] [] [] [] []        [] [] [] [] [] [] [] [] [] [] []    I=Independent, Mod I=Modified Independent, S=Supervision/Setup, SBA=Standby Assistance, CGA=Contact Guard Assistance, Min=Minimal Assistance, Mod=Moderate Assistance, Max=Maximal Assistance, Total=Total Assistance, NT=Not Tested    ACTIVITIES OF DAILY LIVING: I Mod I S SBA CGA Min Mod Max Total NT Comments   BASIC ADLs:              Upper Body Bathing [] [] [x] [] [] [] [] [] [] []    Lower Body Bathing [] [] [x] [] [] [] [] [] [] []    Toileting [] [] [] [] [] [] [] [] [] []    Upper Body Dressing [] [] [x] [] [] [] [] [] [] []    Lower Body Dressing [] [] [] [] [] [x] [] [] [] [] Able to manage underwear but always needs assistance w socks   Feeding [] [] [] [] [] [] [] [] [] []    Grooming [] [] [] [x] [] [] [] [] [] [] Standing at sink   Personal [facility] [] [] [] [] [] [] [] [] [] []    Functional Mobility [] [] [] [x] [] [] [] [] [] [] RW   I=Independent, Mod I=Modified Independent, S=Supervision/Setup, SBA=Standby Assistance, CGA=Contact Guard Assistance, Min=Minimal Assistance, Mod=Moderate Assistance, Max=Maximal Assistance, Total=Total Assistance, NT=Not Tested    PLAN:     FREQUENCY/DURATION     for duration of hospital stay or until stated goals are met, whichever comes first.    ACUTE OCCUPATIONAL THERAPY GOALS:   (Developed with and agreed upon by patient and/or caregiver.)    GOALS:   DISCHARGE GOALS (in preparation for going home/rehab):  3 days  1. Ms. Bryce Mooney will perform lower body dressing activity with minimal assist required to demonstrate improved functional mobility and safety. -GOAL MET 6/17/22        2. Ms. Bryce Mooney will perform bathing activity with minimal assist required to demonstrate improved functional mobility and safety. GOAL MET 6/17/2022   3. Ms. Bryce Mooney will perform toileting activity with  contact guard assist to demonstrate improved functional mobility and safety. -GOAL MET 6/17/22    4.   Ms. Bryce Mooney will 1000  Minutes: [address] [place], [place]

## 2022-06-17 NOTE — PROGRESS NOTES
Hospitalist Progress Note   Admit Date:  2022  8:13 AM   Name:  Riley Merchant   Age:  64 y.o. Sex:  female  :  1960   MRN:  [id number]   Room:  [facility]/    Presenting Complaint: No chief complaint on file. Reason(s) for Admission: Primary osteoarthritis of right knee [M17.11]  Arthritis of right knee [M17.11]     Hospital Course & Interval History:   Riley Merchant is a 64 y.o. female with history of HTN, HL, DM, obesity, HFpEG, fatty liver, Hashimoto's disease, IBD, KHAI, diabetic retinopathy who was admitted for right TKA. Subjective/24hr Events (22): Doing well this AM. Worked with PT. Having some concerns for itchiness around incision and potentially related to the adhesive. Otherwise pain well controlled and pt ready to be discharged. Denies n/v/d, constipation, dizziness, CP, SOB. Assessment & Plan:     S/p R TKA  - management per primary team  - advised pt that she can take antihistamine PRN at home for itching symptoms     Hashimoto's  - continue synthroid     HFpEF  - continue ASA  - continue torsemide and spironolactone     DM  - continue glucotrol, lantus, humalog  - per patient having difficulties management glucose as an outpatient; advised on the importance of post-op wound healing and need for adequate glucose control;   - will place order to inpatient diabetic management team     Discharge Planning:     - per primary team     Diet:  ADULT DIET; Regular  DVT PPx: per primary team  Code status: FULL code      Discharge Planning:      - per primary team    Diet:  ADULT DIET;  Regular; 3 carb choices (45 gm/meal)  DVT PPx: per primary team  Code status: Full Code    Hospital Problems           Last Modified POA    * (Principal) Arthritis of right knee 2022 Yes            Objective:     Patient Vitals for the past 24 hrs:   Temp Pulse Resp BP SpO2   22 1005 -- -- 18 -- --   22 0757 97.5 °F (36.4 °C) 80 18 (!) 120/57 93 %   22 0621 -- -- 17 -- --   06/17/22 0551 -- -- 17 -- --   06/17/22 0333 97.5 °F (36.4 °C) 86 16 119/68 99 %   06/17/22 0212 -- -- 17 -- --   06/17/22 0142 -- -- 18 -- --   06/16/22 2326 97.5 °F (36.4 °C) 93 16 (!) 144/75 98 %   06/16/22 2149 -- -- 17 -- --   06/16/22 2119 -- -- 17 -- --   06/16/22 2112 -- 82 -- -- 94 %   06/16/22 1923 97.5 °F (36.4 °C) 96 16 (!) 155/67 94 %   06/16/22 1451 -- 81 20 134/83 93 %   06/16/22 1424 -- 90 -- -- 91 %   06/16/22 1302 -- 81 16 (!) 178/66 98 %   06/16/22 1247 -- 84 15 (!) 144/64 99 %   06/16/22 1242 -- 80 16 (!) 135/58 100 %   06/16/22 1237 -- 74 16 (!) 144/62 100 %   06/16/22 1232 98.5 °F (36.9 °C) 75 16 (!) 133/57 96 %       Estimated body mass index is 48.06 kg/m² as calculated from the following:    Height as of 6/7/22: 5' 4\" (1.626 m). Weight as of this encounter: 280 lb (127 kg). Intake/Output Summary (Last 24 hours) at 6/17/2022 1120  Last data filed at 6/16/2022 1923  Gross per 24 hour   Intake 1100 ml   Output 151 ml   Net 949 ml         Physical Exam:   Blood pressure (!) 120/57, pulse 80, temperature 97.5 °F (36.4 °C), resp. rate 18, weight 280 lb (127 kg), SpO2 93 %. General:          Well nourished. No overt distress. Morbidly obese F laying in bed in NAD  Head:               Normocephalic, atraumatic  Eyes:               Sclerae appear normal.  Pupils equally round. ENT:                Nares appear normal, no drainage. Moist oral mucosa  Neck:               No restricted ROM. Trachea midline   CV:                  RRR. No m/r/g. No jugular venous distension. Lungs:             CTAB. No wheezing, rhonchi, or rales. Respirations even, unlabored  Abdomen: Bowel sounds present. Soft, nontender, nondistended. Extremities:     No cyanosis or clubbing. No edema. R knee wrapped without e/o bleeding or drainage  Skin:                No rashes and normal coloration. Warm and dry. Neuro:             CN II-XII grossly intact. Sensation intact. A&Ox3  Psych:             Normal mood and affect. I have reviewed ordered lab tests and independently visualized imaging below:    Recent Labs:  Recent Results (from the past 48 hour(s))   POCT Glucose    Collection Time: 06/16/22  9:21 AM   Result Value Ref Range    POC Glucose 126 (H) 65 - 100 mg/dL    Performed by: Perfecto    POCT Glucose    Collection Time: 06/16/22 12:54 PM   Result Value Ref Range    POC Glucose 91 65 - 100 mg/dL    Performed by: Tyler    POCT Glucose    Collection Time: 06/16/22  3:56 PM   Result Value Ref Range    POC Glucose 226 (H) 65 - 100 mg/dL    Performed by: Bull    POCT Glucose    Collection Time: 06/16/22  9:12 PM   Result Value Ref Range    POC Glucose 399 (H) 65 - 100 mg/dL    Performed by: Augusta    Hemoglobin and Hematocrit    Collection Time: 06/17/22  3:44 AM   Result Value Ref Range    Hemoglobin 12.6 11.7 - 15.4 g/dL    Hematocrit 39.8 35.8 - 46.3 %   POCT Glucose    Collection Time: 06/17/22  5:56 AM   Result Value Ref Range    POC Glucose 307 (H) 65 - 100 mg/dL    Performed by: Augusta          Other Studies:  No results found.     Current Meds:  Current Facility-Administered Medications   Medication Dose Route Frequency    dicyclomine (BENTYL) capsule 10 mg  10 mg Oral Q6H PRN    glipiZIDE (GLUCOTROL) tablet 10 mg  10 mg Oral Daily    levothyroxine (SYNTHROID) tablet 100 mcg  100 mcg Oral Daily    losartan (COZAAR) tablet 100 mg  100 mg Oral Daily    pantoprazole (PROTONIX) tablet 40 mg  40 mg Oral Daily    spironolactone (ALDACTONE) tablet 25 mg  25 mg Oral Daily    torsemide (DEMADEX) tablet 20 mg  20 mg Oral Daily    traZODone (DESYREL) tablet 50 mg  50 mg Oral Nightly PRN    0.9 % sodium chloride infusion   IntraVENous Continuous    sodium chloride flush 0.9 % injection 5-40 mL  5-40 mL IntraVENous 2 times per day    sodium chloride flush 0.9 % injection 5-40 mL  5-40 mL IntraVENous PRN    0.9 % sodium chloride infusion   IntraVENous PRN    acetaminophen (TYLENOL) tablet 650 mg  650 mg Oral Q6H    oxyCODONE (ROXICODONE) immediate release tablet 5 mg  5 mg Oral Q4H PRN    Or    oxyCODONE (ROXICODONE) immediate release tablet 10 mg  10 mg Oral Q4H PRN    HYDROmorphone HCl PF (DILAUDID) injection 0.5 mg  0.5 mg IntraVENous Q3H PRN    Or    HYDROmorphone HCl PF (DILAUDID) injection 1 mg  1 mg IntraVENous Q3H PRN    promethazine (PHENERGAN) tablet 25 mg  25 mg Oral Q6H PRN    Or    ondansetron (ZOFRAN) injection 4 mg  4 mg IntraVENous Q6H PRN    sennosides-docusate sodium (SENOKOT-S) 8.6-50 MG tablet 1 tablet  1 tablet Oral BID    aspirin EC tablet 81 mg  81 mg Oral BID    aluminum & magnesium hydroxide-simethicone (MAALOX) 200-200-20 MG/5ML suspension 15 mL  15 mL Oral Q6H PRN    diphenhydrAMINE (BENADRYL) capsule 25 mg  25 mg Oral Q6H PRN    Or    diphenhydrAMINE (BENADRYL) injection 25 mg  25 mg IntraVENous Q6H PRN    naloxone (NARCAN) injection 0.4 mg  0.4 mg IntraVENous PRN    methocarbamol (ROBAXIN) tablet 750 mg  750 mg Oral 4x Daily PRN    insulin lispro (HUMALOG) injection vial 1 Units  1 Units SubCUTAneous TID WC    insulin glargine (LANTUS) injection vial 80 Units  80 Units SubCUTAneous BID     Current Outpatient Medications   Medication Sig    methocarbamol (ROBAXIN) 750 MG tablet Take 1 tablet by mouth 4 times daily as needed (muscle spasms)    aspirin 81 MG EC tablet Take 1 tablet by mouth 2 times daily    oxyCODONE (ROXICODONE) 5 MG immediate release tablet Take 1-2 tablets by mouth every 4 hours as needed for Pain for up to 7 days.     glipiZIDE (GLUCOTROL XL) 10 MG extended release tablet Take 10 mg by mouth daily    levothyroxine (SYNTHROID) 100 MCG tablet Take 100 mcg by mouth Daily    Insulin Glargine, 2 Unit Dial, (TOUJEO MAX SOLOSTAR) 300 UNIT/ML SOPN Inject 80 Units into the skin in the morning and at bedtime    traZODone (DESYREL) 50 MG tablet Take 50 mg by mouth nightly as needed for Sleep    vitamin B-12 (CYANOCOBALAMIN) 1000 MCG tablet Take 1,000 mcg by mouth daily    acetaminophen (TYLENOL) 325 MG tablet Take 650 mg by mouth every 4 hours as needed    dicyclomine (BENTYL) 10 MG capsule Take 10 mg by mouth as needed    ergocalciferol (ERGOCALCIFEROL) 1.25 MG (60192 UT) capsule Take 50,000 Units by mouth Every Tuesday and Friday    insulin aspart (NOVOLOG) 100 UNIT/ML injection pen Inject 1 Units into the skin 3 times daily (before meals) Sliding scale    losartan (COZAAR) 100 MG tablet Take 100 mg by mouth daily    ondansetron (ZOFRAN) 8 MG tablet Take 8 mg by mouth every 8 hours as needed (Patient not taking: Reported on 6/16/2022)    pantoprazole (PROTONIX) 40 MG tablet Take 40 mg by mouth daily    spironolactone (ALDACTONE) 25 MG tablet Take by mouth daily    torsemide (DEMADEX) 20 MG tablet TAKE 1 TABLET BY MOUTH EVERY DAY    traZODone (DESYREL) 50 MG tablet Take 50 mg by mouth nightly       Signed:  NELSON Soler

## 2022-06-17 NOTE — PROGRESS NOTES
2022         Post Op day: 1 Day Post-Op     Admit Date: 2022  Admit Diagnosis: Primary osteoarthritis of right knee [M17.11]  Arthritis of right knee [M17.11]        Subjective: Doing well, No complaints, No SOB, No Chest Pain, No Nausea or Vomiting     Objective:   Vital Signs are Stable, No Acute Distress, Alert and Oriented, Dressing is Dry,  Neurovascular exam is normal.     Assessment / Plan :  Patient Active Problem List   Diagnosis    Hyperlipidemia, mixed    Closed compression fracture of thoracic vertebra (HCC)    Osteoporosis    Diabetic retinopathy associated with type 2 diabetes mellitus [facility]    Acute kidney injury ([facility]    Hypothyroidism    Acute heart failure with preserved ejection fraction (HCC)    Ob[facility] morbid ([facility]    Bilateral edema of lower extremity    Difficulty with CPAP nasal mask use    HAGAN (dyspnea on exertion)    Essential hypertension    KHAI on CPAP    Diabetic neuropathy (HCC)    Depression    Arthritis of right knee      Patient Vitals for the past 8 hrs:   BP Temp Temp src Pulse Resp SpO2   22 0621 -- -- -- -- 17 --   22 0551 -- -- -- -- 17 --   22 0333 119/68 97.5 °F (36.4 °C) Oral 86 16 99 %   22 0212 -- -- -- -- 17 --   22 0142 -- -- -- -- 18 --    Temp (24hrs), Av.8 °F (36.6 °C), Min:97.5 °F (36.4 °C), Max:98.5 °F (36.9 °C)    Body mass index is 48.06 kg/m².     Lab Results   Component Value Date    HGB 12.6 2022      Pt seen by and discussed with elmenus today       Signed By: NELSON Nguyen

## 2022-06-17 NOTE — DISCHARGE SUMMARY
[address] [place] [address]  Total Joint Discharge Summary      Patient ID:  Fadumo Lincoln  [id number]  73 y.o.  1960    Admit date: 6/16/2022  Discharge date and time: 6-17-22  Admitting Physician: Michelle Carroll MD  Surgeon: Same  Admission Diagnoses: Primary osteoarthritis of right knee [M17.11]  Arthritis of right knee [M17.11]  Discharge Diagnoses: Principal Problem:    Arthritis of right knee  Resolved Problems:    * No resolved hospital problems. *                              Perioperative Antibiotics: Ancef 1 to 3 g was given depending on patient's weight.  If allergic to Ancef or due to other indications, patient was given Vancomycin/Gent per protocol      Hospital Medications given:   glipiZIDE, 10 mg, Daily  levothyroxine, 100 mcg, Daily  losartan, 100 mg, Daily  pantoprazole, 40 mg, Daily  spironolactone, 25 mg, Daily  torsemide, 20 mg, Daily  sodium chloride flush, 5-40 mL, 2 times per day  acetaminophen, 650 mg, Q6H  sennosides-docusate sodium, 1 tablet, BID  aspirin, 81 mg, BID  insulin lispro, 1 Units, TID WC  insulin glargine, 80 Units, BID      sodium chloride  sodium chloride      dicyclomine, 10 mg, Q6H PRN  traZODone, 50 mg, Nightly PRN  sodium chloride flush, 5-40 mL, PRN  sodium chloride, , PRN  oxyCODONE, 5 mg, Q4H PRN   Or  oxyCODONE, 10 mg, Q4H PRN  HYDROmorphone, 0.5 mg, Q3H PRN   Or  HYDROmorphone, 1 mg, Q3H PRN  promethazine, 25 mg, Q6H PRN   Or  ondansetron, 4 mg, Q6H PRN  aluminum & magnesium hydroxide-simethicone, 15 mL, Q6H PRN  diphenhydrAMINE, 25 mg, Q6H PRN   Or  diphenhydrAMINE, 25 mg, Q6H PRN  naloxone, 0.4 mg, PRN  methocarbamol, 750 mg, 4x Daily PRN        Discharge Medications given:  Current Discharge Medication List      START taking these medications    Details   aspirin 81 MG EC tablet Take 1 tablet by mouth 2 times daily  Qty: 60 tablet, Refills: 0      oxyCODONE (ROXICODONE) 5 MG immediate release tablet Take 1-2 tablets by mouth every 4 hours as needed for Pain for up to 7 days. Qty: 60 tablet, Refills: 0    Comments: Reduce doses taken as pain becomes manageable  Associated Diagnoses: Total knee replacement status, right         CONTINUE these medications which have CHANGED    Details   methocarbamol (ROBAXIN) 750 MG tablet Take 1 tablet by mouth 4 times daily as needed (muscle spasms)  Qty: 40 tablet, Refills: 0         CONTINUE these medications which have NOT CHANGED    Details   glipiZIDE (GLUCOTROL XL) 10 MG extended release tablet Take 10 mg by mouth daily      levothyroxine (SYNTHROID) 100 MCG tablet Take 100 mcg by mouth Daily      Insulin Glargine, 2 Unit Dial, (TOUJEO MAX SOLOSTAR) 300 UNIT/ML SOPN Inject 80 Units into the skin in the morning and at bedtime      !! traZODone (DESYREL) 50 MG tablet Take 50 mg by mouth nightly as needed for Sleep      vitamin B-12 (CYANOCOBALAMIN) 1000 MCG tablet Take 1,000 mcg by mouth daily      acetaminophen (TYLENOL) 325 MG tablet Take 650 mg by mouth every 4 hours as needed      dicyclomine (BENTYL) 10 MG capsule Take 10 mg by mouth as needed      ergocalciferol (ERGOCALCIFEROL) 1.25 MG (88114 UT) capsule Take 50,000 Units by mouth Every Tuesday and Friday      insulin aspart (NOVOLOG) 100 UNIT/ML injection pen Inject 1 Units into the skin 3 times daily (before meals) Sliding scale      losartan (COZAAR) 100 MG tablet Take 100 mg by mouth daily      ondansetron (ZOFRAN) 8 MG tablet Take 8 mg by mouth every 8 hours as needed      pantoprazole (PROTONIX) 40 MG tablet Take 40 mg by mouth daily      spironolactone (ALDACTONE) 25 MG tablet Take by mouth daily      torsemide (DEMADEX) 20 MG tablet TAKE 1 TABLET BY MOUTH EVERY DAY      ! ! traZODone (DESYREL) 50 MG tablet Take 50 mg by mouth nightly       !! - Potential duplicate medications found. Please discuss with provider.       STOP taking these medications       ketorolac (TORADOL) 10 MG tablet Comments:   Reason for Stopping:         traMADol (ULTRAM) 50 MG

## 2022-06-17 NOTE — PROGRESS NOTES
06/16/22 2112   Oxygen Therapy/Pulse Ox   O2 Therapy Room air   O2 Device None (Room air)   Heart Rate 82   SpO2 94 %   Pulse Oximeter Device Mode Intermittent   Pulse Oximeter Device Location Right;Finger   $Pulse Oximeter $Spot check (single)   Pt has home CPAP for tonight.

## 2022-06-17 NOTE — DIABETES MGMT
Patient admitted with arthritis of right knee. Blood glucose ranged  yesterday with patient receiving Lantus 80 units, Humalog 11 units, and Decadron 10 mg IV. Blood glucose this morning was 307. Reviewed patient current regimen: Lantus 80 units BID, Humalog 1 units with meals, and Glipizide 10 mg daily. Patient would likely benefit from initiation of correctional insulin AC&HS. Patient would also likely benefit discontinuation of glipizide while in the hospital as ADA recommendations discontinuation of oral diabetes medications while inpatient to reduce the risk of hypoglycemic. Provider updated via [employer] regarding recommendations and glycemic control.

## 2022-06-18 ENCOUNTER — HOME CARE VISIT (OUTPATIENT)
Dept: SCHEDULING | Facility: HOME HEALTH | Age: 62
End: 2022-06-18
Payer: COMMERCIAL

## 2022-06-18 PROCEDURE — 400013 HH SOC

## 2022-06-18 PROCEDURE — G0151 HHCP-SERV OF PT,EA 15 MIN: HCPCS

## 2022-06-18 NOTE — CARE COORDINATION
Patient is a 64y.o. year old female admitted for Right TKA . Patient plans to return home on discharge. Order received to arrange home health. Patient without preference towards agency. Referral sent to [facility]. Patient denies any equipment needs as patient has a walker. She does not have ins for a 3-1 BSC but made aware of where she can purchase if needed. Pt unsure of what would fit into her bathroom. She will discuss with therapy.        06/16/22 4229   Service Assessment   Patient Orientation Alert and Oriented   Cognition Alert   History Provided By Patient   Primary Caregiver Self   Support Systems Spouse/Significant Other   Services At/After Discharge   Transition of Care Consult (CM Consult) [facility] Discharge Home Health;PT   Mode of Transport at Discharge Self   Condition of Participation: Discharge Planning   The Plan for Transition of Care is related to the following treatment goals: improve mobility   The Patient and/or Patient Representative was provided with a Choice of Provider? Patient   The Patient and/Or Patient Representative agree with the Discharge Plan? Yes   Freedom of Choice list was provided with basic dialogue that supports the patient's individualized plan of care/goals, treatment preferences, and shares the quality data associated with the providers?   Yes

## 2022-06-20 VITALS
SYSTOLIC BLOOD PRESSURE: 118 MMHG | OXYGEN SATURATION: 98 % | RESPIRATION RATE: 18 BRPM | HEART RATE: 60 BPM | TEMPERATURE: 97.8 F | DIASTOLIC BLOOD PRESSURE: 64 MMHG

## 2022-06-20 ASSESSMENT — ENCOUNTER SYMPTOMS
PAIN LOCATION - PAIN QUALITY: ACHE
DYSPNEA ACTIVITY LEVEL: AFTER AMBULATING MORE THAN 20 FT

## 2022-06-21 ENCOUNTER — HOME CARE VISIT (OUTPATIENT)
Dept: SCHEDULING | Facility: HOME HEALTH | Age: 62
End: 2022-06-21
Payer: COMMERCIAL

## 2022-06-21 ENCOUNTER — HOME CARE VISIT (OUTPATIENT)
Dept: HOME HEALTH SERVICES | Facility: HOME HEALTH | Age: 62
End: 2022-06-21
Payer: COMMERCIAL

## 2022-06-21 VITALS
RESPIRATION RATE: 16 BRPM | TEMPERATURE: 97.9 F | OXYGEN SATURATION: 95 % | SYSTOLIC BLOOD PRESSURE: 148 MMHG | HEART RATE: 72 BPM | DIASTOLIC BLOOD PRESSURE: 80 MMHG

## 2022-06-21 PROCEDURE — G0157 HHC PT ASSISTANT EA 15: HCPCS

## 2022-06-22 ENCOUNTER — HOME CARE VISIT (OUTPATIENT)
Dept: SCHEDULING | Facility: HOME HEALTH | Age: 62
End: 2022-06-22
Payer: COMMERCIAL

## 2022-06-22 VITALS
DIASTOLIC BLOOD PRESSURE: 78 MMHG | SYSTOLIC BLOOD PRESSURE: 128 MMHG | OXYGEN SATURATION: 97 % | TEMPERATURE: 97.6 F | HEART RATE: 72 BPM | RESPIRATION RATE: 16 BRPM

## 2022-06-22 PROCEDURE — G0157 HHC PT ASSISTANT EA 15: HCPCS

## 2022-06-22 ASSESSMENT — ENCOUNTER SYMPTOMS: PAIN LOCATION - PAIN QUALITY: ACHE, SORE

## 2022-06-24 ENCOUNTER — TELEPHONE (OUTPATIENT)
Dept: ORTHOPEDIC SURGERY | Age: 62
End: 2022-06-24

## 2022-06-24 ENCOUNTER — HOME CARE VISIT (OUTPATIENT)
Dept: SCHEDULING | Facility: HOME HEALTH | Age: 62
End: 2022-06-24
Payer: COMMERCIAL

## 2022-06-24 ENCOUNTER — POST-OP TELEPHONE (OUTPATIENT)
Dept: ORTHOPEDICS UNIT | Age: 62
End: 2022-06-24

## 2022-06-24 DIAGNOSIS — M17.11 PRIMARY OSTEOARTHRITIS OF RIGHT KNEE: Primary | ICD-10-CM

## 2022-06-24 PROCEDURE — G0157 HHC PT ASSISTANT EA 15: HCPCS

## 2022-06-24 RX ORDER — FLUCONAZOLE 150 MG/1
150 TABLET ORAL DAILY
Qty: 3 TABLET | Refills: 0 | Status: SHIPPED | OUTPATIENT
Start: 2022-06-24 | End: 2022-06-27

## 2022-06-24 NOTE — TELEPHONE ENCOUNTER
Called patient per request of [facility] PTA d/t uncontrolled post op pain. Spoke with patient. 7 days post op TKA. Pain had been controlled until yesterday afternoon. Trying to take only 5 mg of oxycodone instead of 10 mg. Has not been using cool jet ice machine. Advised regarding alternating oxycodone, ES tylenol and robaxin. Will take oxycodone 10 mg Q 4 hours PRN until pain level is better controlled. Advised regarding cool jet ice machine protocol. Will contact navigator Monday 6/27/22 if post op pain remains elevated after above interventions.

## 2022-06-24 NOTE — TELEPHONE ENCOUNTER
She had surgery recently and took antibiotics. She feels she needs some Diflucan sent to the pharmacy on file.

## 2022-06-26 VITALS
OXYGEN SATURATION: 96 % | RESPIRATION RATE: 18 BRPM | DIASTOLIC BLOOD PRESSURE: 74 MMHG | TEMPERATURE: 98 F | SYSTOLIC BLOOD PRESSURE: 140 MMHG | HEART RATE: 70 BPM

## 2022-06-26 ASSESSMENT — ENCOUNTER SYMPTOMS: PAIN LOCATION - PAIN QUALITY: BURNING, SHARP

## 2022-06-27 ENCOUNTER — HOME CARE VISIT (OUTPATIENT)
Dept: SCHEDULING | Facility: HOME HEALTH | Age: 62
End: 2022-06-27
Payer: COMMERCIAL

## 2022-06-27 VITALS
HEART RATE: 64 BPM | RESPIRATION RATE: 18 BRPM | TEMPERATURE: 97.3 F | OXYGEN SATURATION: 96 % | DIASTOLIC BLOOD PRESSURE: 74 MMHG | SYSTOLIC BLOOD PRESSURE: 140 MMHG

## 2022-06-27 DIAGNOSIS — M17.11 PRIMARY OSTEOARTHRITIS OF RIGHT KNEE: Primary | ICD-10-CM

## 2022-06-27 PROCEDURE — G0157 HHC PT ASSISTANT EA 15: HCPCS

## 2022-06-27 RX ORDER — OXYCODONE HYDROCHLORIDE 5 MG/1
5-10 TABLET ORAL EVERY 4 HOURS PRN
Qty: 60 TABLET | Refills: 0 | Status: SHIPPED | OUTPATIENT
Start: 2022-06-27 | End: 2022-07-04

## 2022-06-27 ASSESSMENT — ENCOUNTER SYMPTOMS: PAIN LOCATION - PAIN QUALITY: ACHE, THROBBING

## 2022-06-29 ENCOUNTER — HOME CARE VISIT (OUTPATIENT)
Dept: SCHEDULING | Facility: HOME HEALTH | Age: 62
End: 2022-06-29
Payer: COMMERCIAL

## 2022-06-29 ENCOUNTER — HOME CARE VISIT (OUTPATIENT)
Dept: HOME HEALTH SERVICES | Facility: HOME HEALTH | Age: 62
End: 2022-06-29
Payer: COMMERCIAL

## 2022-06-29 VITALS
RESPIRATION RATE: 20 BRPM | OXYGEN SATURATION: 95 % | HEART RATE: 68 BPM | TEMPERATURE: 98.2 F | DIASTOLIC BLOOD PRESSURE: 62 MMHG | SYSTOLIC BLOOD PRESSURE: 130 MMHG

## 2022-06-29 PROCEDURE — G0157 HHC PT ASSISTANT EA 15: HCPCS

## 2022-06-29 ASSESSMENT — ENCOUNTER SYMPTOMS: PAIN LOCATION - PAIN QUALITY: ACHE, THROBBING

## 2022-06-30 DIAGNOSIS — M17.11 PRIMARY OSTEOARTHRITIS OF RIGHT KNEE: Primary | ICD-10-CM

## 2022-07-01 ENCOUNTER — HOME CARE VISIT (OUTPATIENT)
Dept: SCHEDULING | Facility: HOME HEALTH | Age: 62
End: 2022-07-01
Payer: COMMERCIAL

## 2022-07-01 PROCEDURE — G0157 HHC PT ASSISTANT EA 15: HCPCS

## 2022-07-03 VITALS
HEART RATE: 64 BPM | OXYGEN SATURATION: 95 % | TEMPERATURE: 97.8 F | DIASTOLIC BLOOD PRESSURE: 68 MMHG | RESPIRATION RATE: 18 BRPM | SYSTOLIC BLOOD PRESSURE: 120 MMHG

## 2022-07-03 ASSESSMENT — ENCOUNTER SYMPTOMS: PAIN LOCATION - PAIN QUALITY: ACHE

## 2022-07-04 ENCOUNTER — HOME CARE VISIT (OUTPATIENT)
Dept: SCHEDULING | Facility: HOME HEALTH | Age: 62
End: 2022-07-04
Payer: COMMERCIAL

## 2022-07-04 VITALS
TEMPERATURE: 97.3 F | RESPIRATION RATE: 16 BRPM | OXYGEN SATURATION: 95 % | DIASTOLIC BLOOD PRESSURE: 76 MMHG | SYSTOLIC BLOOD PRESSURE: 140 MMHG | HEART RATE: 60 BPM

## 2022-07-04 PROCEDURE — G0151 HHCP-SERV OF PT,EA 15 MIN: HCPCS

## 2022-07-04 ASSESSMENT — ENCOUNTER SYMPTOMS
CONSTIPATION: 1
PAIN LOCATION - PAIN QUALITY: ACHES, SORE
DYSPNEA ACTIVITY LEVEL: AFTER AMBULATING MORE THAN 20 FT

## 2022-07-13 RX ORDER — ASPIRIN 81 MG/1
TABLET, COATED ORAL
Qty: 60 TABLET | Refills: 0 | OUTPATIENT
Start: 2022-07-13

## 2022-07-18 ENCOUNTER — OFFICE VISIT (OUTPATIENT)
Dept: ORTHOPEDIC SURGERY | Age: 62
End: 2022-07-18

## 2022-07-18 DIAGNOSIS — Z96.651 HISTORY OF TOTAL KNEE ARTHROPLASTY, RIGHT: Primary | ICD-10-CM

## 2022-07-18 DIAGNOSIS — Z96.651 HISTORY OF TOTAL KNEE ARTHROPLASTY, RIGHT: ICD-10-CM

## 2022-07-18 DIAGNOSIS — Z09 FOLLOW-UP EXAMINATION: ICD-10-CM

## 2022-07-18 DIAGNOSIS — M17.11 PRIMARY OSTEOARTHRITIS OF RIGHT KNEE: Primary | ICD-10-CM

## 2022-07-18 PROCEDURE — 99024 POSTOP FOLLOW-UP VISIT: CPT | Performed by: PHYSICIAN ASSISTANT

## 2022-07-18 RX ORDER — OXYCODONE HYDROCHLORIDE 5 MG/1
5-10 TABLET ORAL EVERY 4 HOURS PRN
Qty: 60 TABLET | Refills: 0 | Status: SHIPPED | OUTPATIENT
Start: 2022-07-18 | End: 2022-07-25

## 2022-07-18 NOTE — PROGRESS NOTES
Name: Kera Pulido  YOB: 1960  Gender: female  MRN: [id number]    RIGHT Post-Op TKA 4 week follow up    This patient returns now four week status post s/p TKA. Things have gone reasonably well with therapy and the patient is now weaning from the cane. The pain level has improved. There has been no significant problem since the patient was last seen. On exam, the incision is healing approriately. ROM is 0 to 110. The patient has minimal antalgia in stance and good alignment in stance. Radiographs are obtained. Standing AP, flexion lateral and sunrise views of the RIGHT knee demonstrates well positioned TKA with good bone implant interfaces. No significant abnormalities are seen. RADIOGRAPHIC IMPRESSION: Stable RIGHT TKA. CLINICAL IMPRESSION AND PLAN: Now four weeks s/p TKA . The patient is doing reasonably well. I have made recommendations about activity. We will ask the patient to continue to wean from the cane. Recommendations for further therapy include OUTPATIENT THERAPY FOR 3-4 WEEKS. The patient will follow back up in in 4-5 months.       Work/Activity Restrictions: NO RESTRICTIONS    NELSON Spears

## 2022-08-15 ENCOUNTER — TELEMEDICINE (OUTPATIENT)
Dept: SLEEP MEDICINE | Age: 62
End: 2022-08-15
Payer: COMMERCIAL

## 2022-08-15 DIAGNOSIS — Z99.89 OSA ON CPAP: Primary | ICD-10-CM

## 2022-08-15 DIAGNOSIS — G47.33 OSA ON CPAP: Primary | ICD-10-CM

## 2022-08-15 PROCEDURE — 99213 OFFICE O/P EST LOW 20 MIN: CPT | Performed by: NURSE PRACTITIONER

## 2022-08-15 RX ORDER — SERTRALINE HYDROCHLORIDE 25 MG/1
TABLET, FILM COATED ORAL
COMMUNITY
Start: 2022-08-05

## 2022-08-15 RX ORDER — EMPAGLIFLOZIN 25 MG/1
TABLET, FILM COATED ORAL
COMMUNITY
Start: 2022-08-07

## 2022-08-15 RX ORDER — ZOLPIDEM TARTRATE 6.25 MG/1
TABLET, FILM COATED, EXTENDED RELEASE ORAL
COMMUNITY
Start: 2022-08-05

## 2022-08-15 ASSESSMENT — SLEEP AND FATIGUE QUESTIONNAIRES
HOW LIKELY ARE YOU TO NOD OFF OR FALL ASLEEP WHILE SITTING QUIETLY AFTER LUNCH WITHOUT ALCOHOL: 1
HOW LIKELY ARE YOU TO NOD OFF OR FALL ASLEEP WHEN YOU ARE A PASSENGER IN A CAR FOR AN HOUR WITHOUT A BREAK: 2
HOW LIKELY ARE YOU TO NOD OFF OR FALL ASLEEP WHILE SITTING AND READING: 1
HOW LIKELY ARE YOU TO NOD OFF OR FALL ASLEEP WHILE LYING DOWN TO REST IN THE AFTERNOON WHEN CIRCUMSTANCES PERMIT: 3
HOW LIKELY ARE YOU TO NOD OFF OR FALL ASLEEP WHILE SITTING AND TALKING TO SOMEONE: 0
HOW LIKELY ARE YOU TO NOD OFF OR FALL ASLEEP WHILE SITTING INACTIVE IN A PUBLIC PLACE: 1
HOW LIKELY ARE YOU TO NOD OFF OR FALL ASLEEP IN A CAR, WHILE STOPPED FOR A FEW MINUTES IN TRAFFIC: 0
HOW LIKELY ARE YOU TO NOD OFF OR FALL ASLEEP WHILE WATCHING TV: 3
ESS TOTAL SCORE: 11

## 2022-08-15 NOTE — PROGRESS NOTES
[address] [place] [address] Court, [address] [facility]  [phone]    Patient Name:  Frannie Adams  YOB: 1960    Pursuant to the emergency declaration under the [facility], [facility] waiver authority and the [employer] Act, this Virtual  Visit was conducted, with patient's consent, to reduce the patient's risk of exposure to COVID-19 and provide continuity of care for an established patient. Telehealth encounter is a billable service, with coverage as determined by the insurance carrier. Services were provided through a video synchronous discussion virtually to substitute for in-person clinic visit. Frannie Adams is a 64 y.o. female who was seen by synchronous (real-time) audio-video technology on 8/15/2022. Consent:  She and/or her healthcare decision maker is aware that this patient-initiated Telehealth encounter is a billable service, with coverage as determined by her insurance carrier. She is aware that she may receive a bill and has provided verbal consent to proceed: Yes        Office Visit 8/15/2022    CHIEF COMPLAINT:    Chief Complaint   Patient presents with    Sleep Apnea       HISTORY OF PRESENT ILLNESS:  Patient is being seen today via virtual visit. Patient was diagnosed with sleep apnea in 2019 with AHI 17/hr with desaturations to 74%. Patient is prescribed APAP 12-16 cm using nasal mask. Download reveals 100% compliance with average nightly use 8 hrs 12 min. AHI is 0.4/hr. Average pressure used is 13.3-15.4 cm. She had a knee replacement since her last visit and has finished physical therapy. She feels the initial pressure is too low. Initial pressure starts at 5 cm. Ramp was turned off per patient's request. She denies any other concerns.               Sleep Medicine 8/15/2022 3/17/2022 9/16/2021 3/16/2021   Sitting and reading 1 1 0 1 Watching TV 3 3 2 1   Sitting, inactive in a public place (e.g. a theatre or a meeting) 1 0 1 0   As a passenger in a car for an hour without a break 2 3 3 3   Lying down to rest in the afternoon when circumstances permit 3 0 3 3   Sitting and talking to someone 0 0 0 0   Sitting quietly after a lunch without alcohol 1 1 1 0   In a car, while stopped for a few minutes in traffic 0 0 0 0   Total score 11 8 10 8           Past Medical History:   Diagnosis Date    IRINA (acute kidney injury) ([address] 11/2020    Arthritis     back    Congestive heart failure ([address] 09/2020    per cardio note (8/19/21) \"shows normal LV function, grade I diastolic dysfunction and no significant valvular abnormalities. \"    Diabetes mellitus ([address]     insulin, -110, S&s hypoglycemia BS 60's, 10/2020 A1c 7.6    Fatty liver     GERD (gastroesophageal reflux disease)     medication    Hashimoto's disease     History of MRSA infection 2020    Hypertension     controlled with meds    Liver disease     CROW (nonalcoholic steatohepatitis)     Psychiatric disorder     anxiety and depression    Renal insufficiency     per pt secondary to diabetes, followed by [facility]    Sleep apnea 02/2021    cpap at     Thyroid disease        Patient Active Problem List   Diagnosis    Hyperlipidemia, mixed    Closed compression fracture of thoracic vertebra (HCC)    Osteoporosis    Diabetic retinopathy associated with type 2 diabetes mellitus ([address]    Acute kidney injury ([address]    Hypothyroidism    Acute heart failure with preserved ejection fraction (HCC)    Obesity, morbid ([address]    Bilateral edema of lower extremity    Difficulty with CPAP nasal mask use    HAGAN (dyspnea on exertion)    Essential hypertension    KHAI on CPAP    Diabetic neuropathy ([address]    Depression    Arthritis of right knee           Past Surgical History:   Procedure Laterality Date    CHOLECYSTECTOMY      COLONOSCOPY N/A 4/9/2021    COLONOSCOPY/ 50 performed by [employer], Jimmy Santiago MD at [facility] ENDOSCOPY    HYSTERECTOMY ([facility])      ORTHOPEDIC SURGERY Right 2017    x 4 surgeries on ankles    ORTHOPEDIC SURGERY Left 2020    x 2 surgeries on ankles    REFRACTIVE SURGERY      TONSILLECTOMY      TOTAL KNEE ARTHROPLASTY Right 6/16/2022    RIGHT KNEE TOTAL ARTHROPLASTY ROBOTIC/NEPTALI REYNOSO performed by Nic Rehman MD at [address] [place] [address] Road History     Socioeconomic History    Marital status:      Spouse name: Not on file    Number of children: Not on file    Years of education: Not on file    Highest education level: Not on file   Occupational History    Not on file   Tobacco Use    Smoking status: Never    Smokeless tobacco: Never   Substance and Sexual Activity    Alcohol use: Not Currently    Drug use: Not Currently    Sexual activity: Not on file   Other Topics Concern    Not on file   Social History Narrative    Not on file     Social Determinants of Health     Financial Resource Strain: Not on file   Food Insecurity: Not on file   Transportation Needs: Not on file   Physical Activity: Not on file   Stress: Not on file   Social Connections: Not on file   Intimate Partner Violence: Not on file   Housing Stability: Not on file         Family History   Problem Relation Age of Onset    Cancer Sister     Other Mother         ALS    COPD Father     Diabetes Sister     Hypertension Mother     Asthma Sister     Heart Disease Father          Allergies   Allergen Reactions    Balsam [place]-Castor Oil      \"irritant'     Clobetasol      \"irritant\"     Cobalt Itching    Dapagliflozin Other (See Comments)     Yeast infection    Dulaglutide Other (See Comments)     Other reaction(s): Nausea and/or vomiting-Intolerance    Ezetimibe Other (See Comments)    Linalool      \"irritant\"     Liraglutide Other (See Comments)    Lisinopril Other (See Comments)     Heaviness in extremities, cough    Metformin Hcl Other (See Comments)    Erythromycin Nausea And Vomiting and Other (See Comments)     Severe GI upset         Current Outpatient Medications   Medication Sig    zolpidem (AMBIEN CR) 6.25 MG extended release tablet TAKE 1 TABLET BY MOUTH EVERY DAY AT BEDTIME AS NEEDED FOR 30 DAYS    JARDIANCE 25 MG tablet TAKE 1 TABLET BY MOUTH EVERY DAY FOR 30 DAYS    sertraline (ZOLOFT) 25 MG tablet TAKE 1 TABLET BY MOUTH EVERY DAY FOR 30 DAYS    docusate sodium (COLACE) 100 MG capsule Take 100 mg by mouth 2 times daily. Plecanatide (TRULANCE) 3 MG TABS Take 3 mg by mouth daily. levothyroxine (SYNTHROID) 100 MCG tablet Take 100 mcg by mouth Daily    Insulin Glargine, 2 Unit Dial, (TOUJEO MAX SOLOSTAR) 300 UNIT/ML SOPN Inject 80 Units into the skin in the morning and at bedtime    vitamin B-12 (CYANOCOBALAMIN) 1000 MCG tablet Take 1,000 mcg by mouth daily    dicyclomine (BENTYL) 10 MG capsule Take 10 mg by mouth as needed (upset stomach)    ergocalciferol (ERGOCALCIFEROL) 1.25 MG (28093 UT) capsule Take 50,000 Units by mouth Every Tuesday and Friday    insulin aspart (NOVOLOG) 100 UNIT/ML injection pen Inject 1 Units into the skin 3 times daily (before meals)    losartan (COZAAR) 100 MG tablet Take 100 mg by mouth daily    ondansetron (ZOFRAN) 8 MG tablet Take 8 mg by mouth every 8 hours as needed for Nausea    pantoprazole (PROTONIX) 40 MG tablet Take 40 mg by mouth daily    spironolactone (ALDACTONE) 25 MG tablet Take 25 mg by mouth daily    torsemide (DEMADEX) 20 MG tablet TAKE 1 TABLET BY MOUTH EVERY DAY    acetaminophen (TYLENOL) 500 MG tablet Take 500 mg by mouth every 6 hours as needed for Pain.  (Patient not taking: Reported on 8/15/2022)    aspirin 81 MG EC tablet Take 1 tablet by mouth 2 times daily    glipiZIDE (GLUCOTROL XL) 10 MG extended release tablet Take 10 mg by mouth daily (Patient not taking: Reported on 8/15/2022)    traZODone (DESYREL) 50 MG tablet Take 50 mg by mouth nightly as needed for Sleep (Patient not taking: Reported on 8/15/2022)    acetaminophen (TYLENOL) 325 MG tablet Take 650 mg by mouth every 4 hours as needed (Patient not taking: Reported on 8/15/2022)    traZODone (DESYREL) 50 MG tablet Take 50 mg by mouth nightly (Patient not taking: Reported on 8/15/2022)     No current facility-administered medications for this visit. REVIEW OF SYSTEMS:   CONSTITUTIONAL:   There is no history of fever, chills, night sweats. CARDIAC:   No chest pain, pressure, discomfort, palpitations, orthopnea, murmurs, or edema. GI:   No dysphagia, heartburn reflux, nausea/vomiting, diarrhea, abdominal pain, or bleeding. NEURO:   There is no history of AMS, persistent headache, decreased level of consciousness, seizures, or motor or sensory deficits. VIRTUAL EXAM  PHYSICAL EXAMINATION:  [ INSTRUCTIONS:  \"[x]\" Indicates a positive item  \"[]\" Indicates a negative item   Vital Signs: (As obtained by patient/caregiver at home)  There were no vitals taken for this visit.      Constitutional: [x] Appears well-developed and well-nourished [x] No apparent distress      [] Abnormal     Mental status: [x] Alert and awake  [x] Oriented to person/place/time [x] Able to follow commands    [] Abnormal -     Eyes:   EOM    [x]  Normal    [] Abnormal -   Sclera  [x]  Normal    [] Abnormal -          Discharge [x]  None visible   [] Abnormal -    HENT: [x] Normocephalic, atraumatic  [] Abnormal -  [x] Mouth/Throat: Mucous membranes are moist    External Ears [x] Normal  [] Abnormal -    Neck: [x] No visualized mass [] Abnormal -     Pulmonary/Chest: [x] Respiratory effort normal   [x] No visualized signs of difficulty breathing or respiratory distress        [] Abnormal -      Musculoskeletal:   [x] Normal gait with no signs of ataxia         [x] Normal range of motion of neck        [] Abnormal -     Neurological:        [x] No Facial Asymmetry (Cranial nerve 7 motor function) (limited exam due to video visit)          [x] No gaze palsy        [] Abnormal -         Skin:        [x] No mon)  (   X  )-Avgslj Tubing (1 per 3 mon)  (   X  )- Disposable Filter (2 per mon)  (   x  )-Aekhey Humidifier (1 per year)     (  x   )-Shimhnyqa (sometimes used with Full Face Mask) (1 per 6 mos)  (    )-Tubing-without heat (1 per 3 mos)  (     )-Non-Disposable Filter (1 per 6 mos)  (  x   )-Water Chamber (1 per 6 mos)  (     )-Humidifier non-heated (1 per 5 yrs)      Signed Date: 8/15/2022  Electronically Signed By: PUNEET Nichole CNP  Electronically Dated:  8/15/2022       No orders of the defined types were placed in this encounter. Collaborating Physician: Dr. Antonio Marquez I spent at least 20 minutes with this established patient, and >50% of the time was spent counseling and/or coordinating care regarding KHAI.     PUNEET Nichole CNP  Electronically signed

## 2022-09-23 ENCOUNTER — OFFICE VISIT (OUTPATIENT)
Dept: ORTHOPEDIC SURGERY | Age: 62
End: 2022-09-23
Payer: COMMERCIAL

## 2022-09-23 DIAGNOSIS — Z96.651 HISTORY OF TOTAL KNEE ARTHROPLASTY, RIGHT: Primary | ICD-10-CM

## 2022-09-23 DIAGNOSIS — M54.50 LOW BACK PAIN AT MULTIPLE SITES: ICD-10-CM

## 2022-09-23 DIAGNOSIS — Z09 FOLLOW-UP EXAMINATION: Primary | ICD-10-CM

## 2022-09-23 DIAGNOSIS — Z96.651 HISTORY OF TOTAL KNEE ARTHROPLASTY, RIGHT: ICD-10-CM

## 2022-09-23 DIAGNOSIS — M70.61 GREATER TROCHANTERIC BURSITIS OF RIGHT HIP: ICD-10-CM

## 2022-09-23 PROCEDURE — 20610 DRAIN/INJ JOINT/BURSA W/O US: CPT | Performed by: PHYSICIAN ASSISTANT

## 2022-09-23 PROCEDURE — 99214 OFFICE O/P EST MOD 30 MIN: CPT | Performed by: PHYSICIAN ASSISTANT

## 2022-09-23 RX ORDER — METHYLPREDNISOLONE ACETATE 40 MG/ML
40 INJECTION, SUSPENSION INTRA-ARTICULAR; INTRALESIONAL; INTRAMUSCULAR; SOFT TISSUE ONCE
Status: COMPLETED | OUTPATIENT
Start: 2022-09-23 | End: 2022-09-23

## 2022-09-23 RX ORDER — DICLOFENAC SODIUM 75 MG/1
75 TABLET, DELAYED RELEASE ORAL 2 TIMES DAILY
Qty: 60 TABLET | Refills: 0 | Status: CANCELLED | OUTPATIENT
Start: 2022-09-23

## 2022-09-23 RX ORDER — TRAMADOL HYDROCHLORIDE 50 MG/1
50-100 TABLET ORAL EVERY 4 HOURS PRN
Qty: 40 TABLET | Refills: 0 | Status: CANCELLED | OUTPATIENT
Start: 2022-09-23 | End: 2022-09-30

## 2022-09-23 RX ADMIN — METHYLPREDNISOLONE ACETATE 40 MG: 40 INJECTION, SUSPENSION INTRA-ARTICULAR; INTRALESIONAL; INTRAMUSCULAR; SOFT TISSUE at 09:05

## 2022-09-23 NOTE — TELEPHONE ENCOUNTER
She is calling because her prescriptions are not at the pharmacy. I told her that I can see that they are pending to be signed. She just wants to make sure it gets sent.

## 2022-09-23 NOTE — PROGRESS NOTES
Name: Lexi Oliver  YOB: 1960  Gender: female  MRN: [id number]      Chief complaint:  RIGHT KNEE PAIN    History of Present Illness:     Lexi Oliver is a 58 y.o. female  She returns today for recheck regarding her right TKA that was performed almost 3 and half months ago now. Overall she is doing fairly well up until about 2 to 3 weeks ago where she noticed sharp increased pain over the anterior lateral patellar region. She denies any recent fall or injury that may have caused this. Also denies any new activities that may have attributed this as well. Currently she is walking without any assistive device. Denies any fever, chills or malaise. She is still taking she states about 1 oxycodone tablet per week but does not take anything else for pain. Past Medical History: Allergies: Allergies   Allergen Reactions    Balsam [place]-Castor Oil      \"irritant'     Clobetasol      \"irritant\"     Cobalt Itching    Dapagliflozin Other (See Comments)     Yeast infection    Dulaglutide Other (See Comments)     Other reaction(s): Nausea and/or vomiting-Intolerance    Ezetimibe Other (See Comments)    Linalool      \"irritant\"     Liraglutide Other (See Comments)    Lisinopril Other (See Comments)     Heaviness in extremities, cough    Metformin Hcl Other (See Comments)    Erythromycin Nausea And Vomiting and Other (See Comments)     Severe GI upset       Medications:  Current Outpatient Medications   Medication Sig    zolpidem (AMBIEN CR) 6.25 MG extended release tablet TAKE 1 TABLET BY MOUTH EVERY DAY AT BEDTIME AS NEEDED FOR 30 DAYS    JARDIANCE 25 MG tablet TAKE 1 TABLET BY MOUTH EVERY DAY FOR 30 DAYS    sertraline (ZOLOFT) 25 MG tablet TAKE 1 TABLET BY MOUTH EVERY DAY FOR 30 DAYS    docusate sodium (COLACE) 100 MG capsule Take 100 mg by mouth 2 times daily. Plecanatide (TRULANCE) 3 MG TABS Take 3 mg by mouth daily.     acetaminophen (TYLENOL) 500 MG tablet Take 500 mg by mouth every 6 hours as needed for Pain. (Patient not taking: Reported on 8/15/2022)    aspirin 81 MG EC tablet Take 1 tablet by mouth 2 times daily    glipiZIDE (GLUCOTROL XL) 10 MG extended release tablet Take 10 mg by mouth daily (Patient not taking: Reported on 8/15/2022)    levothyroxine (SYNTHROID) 100 MCG tablet Take 100 mcg by mouth Daily    Insulin Glargine, 2 Unit Dial, (TOUJEO MAX SOLOSTAR) 300 UNIT/ML SOPN Inject 80 Units into the skin in the morning and at bedtime    traZODone (DESYREL) 50 MG tablet Take 50 mg by mouth nightly as needed for Sleep (Patient not taking: Reported on 8/15/2022)    vitamin B-12 (CYANOCOBALAMIN) 1000 MCG tablet Take 1,000 mcg by mouth daily    acetaminophen (TYLENOL) 325 MG tablet Take 650 mg by mouth every 4 hours as needed (Patient not taking: Reported on 8/15/2022)    dicyclomine (BENTYL) 10 MG capsule Take 10 mg by mouth as needed (upset stomach)    ergocalciferol (ERGOCALCIFEROL) 1.25 MG (36000 UT) capsule Take 50,000 Units by mouth Every Tuesday and Friday    insulin aspart (NOVOLOG) 100 UNIT/ML injection pen Inject 1 Units into the skin 3 times daily (before meals)    losartan (COZAAR) 100 MG tablet Take 100 mg by mouth daily    ondansetron (ZOFRAN) 8 MG tablet Take 8 mg by mouth every 8 hours as needed for Nausea    pantoprazole (PROTONIX) 40 MG tablet Take 40 mg by mouth daily    spironolactone (ALDACTONE) 25 MG tablet Take 25 mg by mouth daily    torsemide (DEMADEX) 20 MG tablet TAKE 1 TABLET BY MOUTH EVERY DAY    traZODone (DESYREL) 50 MG tablet Take 50 mg by mouth nightly (Patient not taking: Reported on 8/15/2022)     No current facility-administered medications for this visit.        Past Medical history:  Past Medical History:   Diagnosis Date    IRINA (acute kidney injury) ([facility] 11/2020    Arthritis     back    Cong[facility] 09/2020    per cardio note (8/19/21) \"shows normal LV function, grade I diastolic dysfunction and no significant valvular abnormalities. \"    Diabetes mellitus ([facility]     insulin, -110, S&s hypoglycemia BS 60's, 10/2020 A1c 7.6    Fatty liver     GERD (gastroesophageal reflux disease)     medication    Hashimoto's disease     History of MRSA infection 2020    Hypertension     controlled with meds    Liver disease     CROW (nonalcoholic steatohepatitis)     Psychiatric disorder     anxiety and depression    Renal insufficiency     per pt secondary to diabetes, followed by [facility] Internal Medicine    Sleep apnea 02/2021    cpap at     Thyroid disease         has a past surgical history that includes Hysterectomy; Tonsillectomy; Refractive surgery; Cholecystectomy; orthopedic surgery (Right, 2017); orthopedic surgery (Left, 2020); Colonoscopy (N/A, 4/9/2021); and Total knee arthroplasty (Right, 6/16/2022). Family History:  [unfilled]     Social History:  [unfilled]    Review of Systems:       Physical Exam:    General:   Alert and oriented    Gait:          Slight limp on the right without the use of any assistive device. No antalgia in stance. Back:        Diffuse tenderness over lower back. RIGHT Hip:    Skin is intact. No pain with palpation over the greater trochanter. No groin pain and restricted ROM of the hip     RIGHT Knee: Skin: well healed surgical incision                    Effusion: no                    Tenderness to palpation: Anterior lateral right knee                    Patella grind test: no                    Stability:  Valgus: yes Varus: yes AP: yes                    Renetta's test: negative                    Quad Strength: good                    ROM   Extension: 0  Flexion:120                    Popliteal cyst : no                    Calf pain : no                    Edema left leg: none noted    Neurovascular:  Patient has good pulses distally. They have intact motor and sensory function.      X-rays:  Not repeated    Diagnoses:  3-month status post right TKA in a patient with chronic low back pain and IT band symptomatology    Plan:  Discussed with the patient that her right knee does appear stable and also counseled her on appropriate expectations roughly 3 months out from her total knee surgery. She is primarily having anterior lateral right knee pain. She also has tremendous chronic low back pain and has quite severe IT band symptoms on the right side as well. We counseled her on how the back can indirectly affect the knee and she was understanding of this. We did proceed with a bursa injection to see if this will help with her symptoms. Also placed her on Voltaren to take twice daily and she was instructed on its use. The she is advised on activity modification as well as use of a cane temporarily to see if it will help offload her right lower extremity. She would also benefit tremendously from weight loss as she has a BMI of 48 and this is certainly not helping her back or her knees. She will attempt these conservative measures as well as continue with her therapy exercises. We will see her back in about 3 months for her midterm follow-up. Procedure note: The RIGHT hip was injected under sterile technique with 40 mg Depo-Medrol and 1 cc 0.5% Marcaine. Patient tolerated this without difficulty.       NELSON Newton

## 2022-09-26 DIAGNOSIS — Z96.651 HISTORY OF TOTAL KNEE ARTHROPLASTY, RIGHT: Primary | ICD-10-CM

## 2022-09-26 RX ORDER — TRAMADOL HYDROCHLORIDE 50 MG/1
50 TABLET ORAL EVERY 4 HOURS PRN
Qty: 30 TABLET | Refills: 0 | Status: SHIPPED | OUTPATIENT
Start: 2022-09-26 | End: 2022-10-03

## 2022-09-26 NOTE — TELEPHONE ENCOUNTER
She was seen Friday and even called to make sure her medicines would be sent in. I could see that it was pending to be signed but it she states it never made it to her pharmacy. Please send asap.

## 2022-09-27 ENCOUNTER — TELEPHONE (OUTPATIENT)
Dept: ORTHOPEDIC SURGERY | Age: 62
End: 2022-09-27

## 2022-09-27 NOTE — TELEPHONE ENCOUNTER
She was supposed to be getting Tramadol and Voltaren and only got the Tramadol. Please send the Voltaren to the pharmacy on file.

## 2022-12-19 ENCOUNTER — OFFICE VISIT (OUTPATIENT)
Dept: ORTHOPEDIC SURGERY | Age: 62
End: 2022-12-19

## 2022-12-19 DIAGNOSIS — Z96.651 HISTORY OF TOTAL KNEE ARTHROPLASTY, RIGHT: Primary | ICD-10-CM

## 2022-12-19 DIAGNOSIS — Z96.651 HISTORY OF TOTAL KNEE ARTHROPLASTY, RIGHT: ICD-10-CM

## 2022-12-19 DIAGNOSIS — E66.01 MORBID OBESITY (HCC): ICD-10-CM

## 2022-12-19 DIAGNOSIS — Z09 SURGERY FOLLOW-UP: ICD-10-CM

## 2022-12-19 LAB
BASOPHILS # BLD: 0 K/UL (ref 0–0.2)
BASOPHILS NFR BLD: 1 % (ref 0–2)
CRP SERPL-MCNC: 1.4 MG/DL (ref 0–0.9)
DIFFERENTIAL METHOD BLD: ABNORMAL
EOSINOPHIL # BLD: 0.2 K/UL (ref 0–0.8)
EOSINOPHIL NFR BLD: 4 % (ref 0.5–7.8)
ERYTHROCYTE [DISTWIDTH] IN BLOOD BY AUTOMATED COUNT: 14.4 % (ref 11.9–14.6)
ERYTHROCYTE [SEDIMENTATION RATE] IN BLOOD: 48 MM/HR (ref 0–30)
HCT VFR BLD AUTO: 47.8 % (ref 35.8–46.3)
HGB BLD-MCNC: 14.6 G/DL (ref 11.7–15.4)
IMM GRANULOCYTES # BLD AUTO: 0 K/UL (ref 0–0.5)
IMM GRANULOCYTES NFR BLD AUTO: 0 % (ref 0–5)
LYMPHOCYTES # BLD: 1.9 K/UL (ref 0.5–4.6)
LYMPHOCYTES NFR BLD: 32 % (ref 13–44)
MCH RBC QN AUTO: 28.5 PG (ref 26.1–32.9)
MCHC RBC AUTO-ENTMCNC: 30.5 G/DL (ref 31.4–35)
MCV RBC AUTO: 93.2 FL (ref 82–102)
MONOCYTES # BLD: 0.5 K/UL (ref 0.1–1.3)
MONOCYTES NFR BLD: 8 % (ref 4–12)
NEUTS SEG # BLD: 3.3 K/UL (ref 1.7–8.2)
NEUTS SEG NFR BLD: 56 % (ref 43–78)
NRBC # BLD: 0 K/UL (ref 0–0.2)
PLATELET # BLD AUTO: 196 K/UL (ref 150–450)
PMV BLD AUTO: 11.5 FL (ref 9.4–12.3)
RBC # BLD AUTO: 5.13 M/UL (ref 4.05–5.2)
WBC # BLD AUTO: 5.9 K/UL (ref 4.3–11.1)

## 2022-12-19 RX ORDER — DICLOFENAC SODIUM 75 MG/1
75 TABLET, DELAYED RELEASE ORAL 2 TIMES DAILY
Qty: 60 TABLET | Refills: 0 | Status: SHIPPED | OUTPATIENT
Start: 2022-12-19

## 2022-12-19 NOTE — PROGRESS NOTES
Name: Clinton Espinal  YOB: 1960  Gender: female  MRN: [id number]    CC: Follow-up right total knee arthroplasty    HPI: Clinton Espinal is a 58 y.o. female who presents in follow-up for right total knee arthroplasty. She is now about 6 months postop in the setting of morbid obesity and chronic pain syndrome. Her surgery was done with a cemented [place] Avinash robot assisted technique. She states her knee still swells and hurts. She does see pain management for other issues and has been advised to follow-up with us for further concerns with her knee replacement. She is not using any assistive device for activity. She does work in a primarily sedentary job. She has had no fever malaise or other constitutional complaints. History was obtained from patient and spouse    ROS/Meds/PSH/PMH/FH/SH: I personally reviewed the patients standard intake form. Below are the pertinents    Tobacco:  reports that she has never smoked. She has never used smokeless tobacco.  Past Medical History:   Diagnosis Date    IRINA (acute kidney injury) ([facility] 11/2020    Arthritis     back    Congestive heart failure ([facility] 09/2020    per cardio note (8/19/21) \"shows normal LV function, grade I diastolic dysfunction and no significant valvular abnormalities. \"    Diabetes mellitus ([facility]     insulin, -110, S&s hypoglycemia BS 60's, 10/2020 A1c 7.6    Fatty liver     GERD (gastroesophageal reflux disease)     medication    Hashimoto's disease     History of MRSA infection 2020    Hypertension     controlled with meds    Liver disease     CROW (nonalcoholic steatohepatitis)     Psychiatric disorder     anxiety and depression    Renal insufficiency     per pt secondary to diabetes, followed by [facility] Internal Medicine    Sleep apnea 02/2021    cpap at     Thyroid disease       Past Surgical History:   Procedure Laterality Date    CHOLECYSTECTOMY      COLONOSCOPY N/A 4/9/2021    COLONOSCOPY/ 50 performed by Gabby Freitas MD at [facility] ENDOSCOPY    HYSTERECTOMY ([address])      ORTHOPEDIC SURGERY Right 2017    x 4 surgeries on ankles    ORTHOPEDIC SURGERY Left 2020    x 2 surgeries on ankles    REFRACTIVE SURGERY      TONSILLECTOMY      TOTAL KNEE ARTHROPLASTY Right 6/16/2022    RIGHT KNEE TOTAL ARTHROPLASTY ROBOTIC/NEPTALI EDGARDO performed by Valentina Pittman MD at [address] [place], [place] [address]ab        Physical Examination:  Physical exam: On examination of the patient's gait there is no obvious limp    On examination while standing there is decreased flexion in the lumbosacral spine without acute list or spasm. While seated ,  the Bilateral hip is examined there is full range of motion without obvious issue . Valri Dykes On examination of the  Right knee :ROM is 0 to 120 There is a mild effusion and she does have a well-healed midline incision. She is able to comfortably flex about 110 215 degrees and going back to 1/21/2025 she states is uncomfortable. She seems to have some light touch tenderness to direct exam anteriorly. . On examination of the  Left knee :ROM is 0 to 125 There is no obvious effusion. Patient is neurologically intact distally. Skin is intact. Imaging:   Radiographs were not repeated today    Assessment:   Continued pain and swelling right knee in a morbidly obese female not 6 months postop. I do think there is some overlap from her chronic pain syndrome as well. I do not see a significant clinical concern here on examination but given her continued complaints of pain and swelling I will go ahead and get a sed rate CRP CBC for reassurance. If she does not have any significant elevations then we will just continue with her recovery. I did talk to her about expectations outcome given her circumstances. We did discuss continued efforts at weight loss and the role of topical measures and oral anti-inflammatory medication.   She may need more focal treatment from pain management if these measures do not help her as she continues to recover. If, of course , her blood work is of concern we would bring her back in for further counseling and possibly an aspiration. Plan:       Follow up:   Return in about 6 months (around 6/19/2023).      Russell Hamilton MD

## 2022-12-21 ENCOUNTER — OFFICE VISIT (OUTPATIENT)
Dept: ORTHOPEDIC SURGERY | Age: 62
End: 2022-12-21

## 2022-12-21 DIAGNOSIS — Z96.651 HISTORY OF TOTAL KNEE ARTHROPLASTY, RIGHT: Primary | ICD-10-CM

## 2022-12-21 DIAGNOSIS — Z96.651 PRESENCE OF TOTAL KNEE JOINT PROSTHESIS, RIGHT: ICD-10-CM

## 2022-12-21 DIAGNOSIS — Z09 SURGERY FOLLOW-UP: ICD-10-CM

## 2022-12-21 LAB
APPEARANCE FLD: NORMAL
COLOR FLD: NORMAL
LYMPHOCYTES NFR BRONCH MANUAL: 38 %
NEUTROPHILS NFR BRONCH MANUAL: 62 %
NUC CELL # FLD: NORMAL /CU MM
RBC # FLD: NORMAL /CU MM
SPECIMEN SOURCE FLD: NORMAL

## 2022-12-21 NOTE — PROGRESS NOTES
Name: Dwight Sero  YOB: 1960  Gender: female  MRN: [id number]      This patient comes in for further conversation and work-up status post sed rate and CRP which were elevated. She is 6 months status post total knee arthroplasty and states over the past certainly sooner depending on results of the culture. Has had more pain and swelling in her knee. She does have a chronic pain syndrome and myriad other issues. She has also been diagnosed with chronic sinusitis. Interval medical surgical changes her last visit few days ago is unchanged. On physical exam she is ambulate with no assistive device no antalgia or limp. Her knee alignment is physiologic. On examination while seated her incision is well-healed there is no significant or excessive warmth in the knee and the minimal effusion is evident today on exam.    Radiographs are obtained. An AP standing lateral and sunrise of the right knee which demonstrates a cemented tricompartmental plan to be in good position without sign of concern. Impression and plan painful right total knee arthroplasty now 6 months postop with elevated CRP and sed rate. Clinically she does not appear to be suspicious for an infection but given the circumstance I recommended an aspiration. Procedure note: Under sterile technique the right knee was aspirated about 10 cc of bloody appearing fluid. This is likely traumatic tap but this was synovial fluid. It was sent for routine biological analysis including a culture, cell count differential, and crystals. Sterile dressings applied she did have some bleeding post aspiration. I will see her back in a couple of weeks or certainly sooner if cultures return back concerning.     Ernesto Higgins MD

## 2022-12-23 LAB
BACTERIA SPEC CULT: NORMAL
GRAM STN SPEC: NORMAL
GRAM STN SPEC: NORMAL
SERVICE CMNT-IMP: NORMAL

## 2022-12-30 LAB
BACTERIA SPEC CULT: NORMAL
SERVICE CMNT-IMP: NORMAL

## 2023-01-11 ENCOUNTER — TELEPHONE (OUTPATIENT)
Dept: ORTHOPEDIC SURGERY | Age: 63
End: 2023-01-11

## 2023-01-17 DIAGNOSIS — Z96.651 HISTORY OF TOTAL KNEE ARTHROPLASTY, RIGHT: ICD-10-CM

## 2023-01-18 ENCOUNTER — OFFICE VISIT (OUTPATIENT)
Dept: ORTHOPEDIC SURGERY | Age: 63
End: 2023-01-18

## 2023-01-18 DIAGNOSIS — Z96.651 PRESENCE OF TOTAL KNEE JOINT PROSTHESIS, RIGHT: ICD-10-CM

## 2023-01-18 DIAGNOSIS — Z09 SURGERY FOLLOW-UP: Primary | ICD-10-CM

## 2023-01-18 NOTE — PROGRESS NOTES
Name: Ros Stapleton  YOB: 1960  Gender: female  MRN: [id number]        This patient comes in in follow-up for her episode of knee pain and swelling following infectious work-up. Her sed rate and CRP were elevated and this prompted an aspiration of the right knee. The aspiration was productive of an appropriate sample but was clotted for cell count and differential.  Cultures were able to be done in were negative. She is now back to her baseline level of function. She describes that in retrospect she had an episode of a significant increase activity a few days prior to her episode of swelling. On physical examination ambulate with no limp no assistive device. Range of motion on inspection the right knee is from full extension 115 degrees of flexion. Radiographs not repeated    Impression and plan: Episode of swelling now resolved now about 6-month status post right total knee arthroplasty and super morbidly obese female with chronic pain. Fortunately her radiographs are unremarkable and her aspiration was negative for infection. I have advised her appropriate expectations given her circumstances in terms of long-term outcome. We talked about activities and significant spikes in activity likely producing some increased pain. She understands and is satisfied and I will see her back in 6 months.     Sofie Painting MD

## 2023-01-19 RX ORDER — DICLOFENAC SODIUM 75 MG/1
TABLET, DELAYED RELEASE ORAL
Qty: 60 TABLET | Refills: 0 | OUTPATIENT
Start: 2023-01-19

## 2023-03-28 NOTE — ANESTHESIA POSTPROCEDURE EVALUATION
[facility] Call Center    Phone Message    May a detailed message be left on voicemail: yes     Reason for Call: Other: Patient called requesting a call back from clinical staff. She had to reschedule her appointment and is now scheduled for June. She is wondering if there is anyway she can be seen sooner. She is also wondering if it is ok for her to wait that long due to her condition. Please call to advise.      Action Taken: Other: eye    Travel Screening: Not Applicable                                                                       Procedure(s):  ESOPHAGOGASTRODUODENOSCOPY (EGD)  COLONOSCOPY/ 50  ESOPHAGEAL DILATION. total IV anesthesia    Anesthesia Post Evaluation        Patient location during evaluation: PACU  Patient participation: complete - patient participated  Level of consciousness: awake and alert  Pain management: adequate  Airway patency: patent  Anesthetic complications: no  Cardiovascular status: acceptable  Respiratory status: acceptable  Hydration status: acceptable  Post anesthesia nausea and vomiting:  controlled  Final Post Anesthesia Temperature Assessment:  Normothermia (36.0-37.5 degrees C)      INITIAL Post-op Vital signs:   Vitals Value Taken Time   /66 04/09/21 1143   Temp 37 °C (98.6 °F) 04/09/21 1126   Pulse 69 04/09/21 1148   Resp 14 04/09/21 1143   SpO2 97 % 04/09/21 1148   Vitals shown include unvalidated device data.

## 2023-06-21 ENCOUNTER — OFFICE VISIT (OUTPATIENT)
Dept: ORTHOPEDIC SURGERY | Age: 63
End: 2023-06-21

## 2023-06-21 DIAGNOSIS — E66.01 MORBID OBESITY (HCC): ICD-10-CM

## 2023-06-21 DIAGNOSIS — M16.11 OSTEOARTHRITIS OF RIGHT HIP, UNSPECIFIED OSTEOARTHRITIS TYPE: ICD-10-CM

## 2023-06-21 DIAGNOSIS — M54.50 CHRONIC RIGHT-SIDED LOW BACK PAIN, UNSPECIFIED WHETHER SCIATICA PRESENT: ICD-10-CM

## 2023-06-21 DIAGNOSIS — M70.61 GREATER TROCHANTERIC BURSITIS OF RIGHT HIP: ICD-10-CM

## 2023-06-21 DIAGNOSIS — G89.29 CHRONIC RIGHT-SIDED LOW BACK PAIN, UNSPECIFIED WHETHER SCIATICA PRESENT: ICD-10-CM

## 2023-06-21 DIAGNOSIS — Z96.651 HISTORY OF TOTAL KNEE ARTHROPLASTY, RIGHT: Primary | ICD-10-CM

## 2023-06-21 NOTE — PROGRESS NOTES
Name: Luis Gunter  YOB: 1960  Gender: female  MRN: [id number]    CC: Follow-up right total knee arthroplasty now 1 year postop/right hip pain    HPI: Luis Gunter is a 58 y.o. female who presents now 1 year status post right total knee arthroplasty done with Avinash cemented tricompartmental implants in the setting of super morbid obesity and chronic back pain. She is doing reasonly well following her early recovery with her total knee arthroplasty. She still has some continued anterior pain in the right knee. She does still see pain management and has seen us a few times for continued pain in her lower back and buttock area. She has had a trochanteric injection under our care which she states did not help a great deal but reports that she had an intra-articular injection under fluoroscopy in her right hip joint about 3 months ago which did help her for a while. She continues to complain of some lower back and primarily buttock and lateral thigh pain. She denies much in way of groin or anterior thigh pain. She states her knee pain and some of her other back and lateral pain was improved following the intra-articular just under pain management. She does not use any assistive device for mobility. She comes in today accompanied by her . History was obtained from patient and spouse    ROS/Meds/PSH/PMH/FH/SH: I personally reviewed the patients standard intake form. Below are the pertinents    Tobacco:  reports that she has never smoked. She has never used smokeless tobacco.  Past Medical History:   Diagnosis Date    IRINA (acute kidney injury) ([facility] 11/2020    Arthritis     back    Congestive heart failure ([facility] 09/2020    per cardio note (8/19/21) \"shows normal LV function, grade I diastolic dysfunction and no significant valvular abnormalities. \"    Diabetes mellitus ([facility]     insulin, -110, S&s hypoglycemia BS 60's, 10/2020 A1c 7.6    Fatty liver     GERD

## 2023-07-31 ENCOUNTER — TRANSCRIBE ORDERS (OUTPATIENT)
Facility: HOSPITAL | Age: 63
End: 2023-07-31

## 2023-07-31 ENCOUNTER — HOSPITAL ENCOUNTER (OUTPATIENT)
Dept: MRI IMAGING | Age: 63
Discharge: HOME | End: 2023-08-03
Payer: COMMERCIAL

## 2023-07-31 DIAGNOSIS — H53.8 BLURRED VISION: ICD-10-CM

## 2023-07-31 DIAGNOSIS — M54.2 NECK PAIN: ICD-10-CM

## 2023-07-31 DIAGNOSIS — R11.0 NAUSEA: ICD-10-CM

## 2023-07-31 DIAGNOSIS — R42 DIZZINESS AND GIDDINESS: ICD-10-CM

## 2023-07-31 DIAGNOSIS — R51.9 INTRACTABLE HEADACHE, UNSPECIFIED CHRONICITY PATTERN, UNSPECIFIED HEADACHE TYPE: ICD-10-CM

## 2023-07-31 DIAGNOSIS — R51.9 INTRACTABLE HEADACHE, UNSPECIFIED CHRONICITY PATTERN, UNSPECIFIED HEADACHE TYPE: Primary | ICD-10-CM

## 2023-07-31 PROCEDURE — 70551 MRI BRAIN STEM W/O DYE: CPT

## 2023-10-11 NOTE — PROGRESS NOTES
[address] [place] [address]  [phone]    Patient Name:  Johny Hong  YOB: 1960    Telehealth encounter is a billable service, with coverage as determined by the insurance carrier. The patient was located at Home: [address] [id number]  The provider was located at [facility] Dept): [address] [place] [address],[address] [place] [address] [address] [facility]  [phone]       Services were provided through a video synchronous discussion virtually to substitute for in-person clinic visit. Johny Hong is a 61 y.o. female who was seen by synchronous (real-time) audio-video technology on 10/12/2023. Consent:  She and/or her healthcare decision maker is aware that this patient-initiated Telehealth encounter is a billable service, with coverage as determined by her insurance carrier. She is aware that she may receive a bill and has provided verbal consent to proceed: Yes        Office Visit 10/12/2023    CHIEF COMPLAINT:    Chief Complaint   Patient presents with    Sleep Apnea    Follow-up       HISTORY OF PRESENT ILLNESS:  Patient is being seen today via virtual visit. Patient's sleep study in 2019 revealed AHI of 17 with lowest desaturation 74%. She is prescribed cpap therapy with a humidifier set at 12-16cm with a nasal mask. Most recent download reveals AHI on PAP therapy is 0.4, leak is 34.9 and the hourly usage is 8 hours 41 minutes nightly. The overall use is 3163 hours with days greater than four hours at 360/365. The patient is compliant with the Pap therapy and is feeling better as a result. Patient states she is not rested in the morning, does have daytime fatigue and will nap at lunch. She states she is falling asleep okay with Ambien 6.25 mg as prescribed by PCP. She states she is waking up constantly, getting choked on the CPAP tubing.   She states overall she is not sleeping

## 2023-10-12 ENCOUNTER — TELEMEDICINE (OUTPATIENT)
Dept: SLEEP MEDICINE | Age: 63
End: 2023-10-12
Payer: COMMERCIAL

## 2023-10-12 DIAGNOSIS — G47.33 OSA ON CPAP: Primary | ICD-10-CM

## 2023-10-12 DIAGNOSIS — Z78.9 DIFFICULTY WITH CPAP USE: ICD-10-CM

## 2023-10-12 DIAGNOSIS — F51.01 PRIMARY INSOMNIA: ICD-10-CM

## 2023-10-12 PROCEDURE — 99213 OFFICE O/P EST LOW 20 MIN: CPT | Performed by: STUDENT IN AN ORGANIZED HEALTH CARE EDUCATION/TRAINING PROGRAM

## 2023-10-12 NOTE — PATIENT INSTRUCTIONS
You will get a call from [employer] to schedule your at-home sleep study in the next 24-48 hours. This device will be mailed to you. It is a 2 night sleep study and once completed, you will return device to address provided. A physician will read the study and you will receive a call from our office with results or to schedule a follow-up appointment with the Sleep Center to discuss treatment options. Mild/Moderate Sleep Apnea can be treated with a custom-fit oral appliance.             [address], [address] [place] [address]    Phone: [phone]

## 2023-12-11 ENCOUNTER — HOSPITAL ENCOUNTER (OUTPATIENT)
Dept: SLEEP CENTER | Age: 63
Discharge: HOME | End: 2023-12-14

## 2023-12-14 DIAGNOSIS — Z96.651 HISTORY OF TOTAL KNEE ARTHROPLASTY, RIGHT: Primary | ICD-10-CM

## 2023-12-14 NOTE — TELEPHONE ENCOUNTER
He left a voice mail message that he needs to speak to someone about the protocol for antibiotics for this patient. She has an upcoming dental appt. Please give him a call.

## 2023-12-15 RX ORDER — AMOXICILLIN 500 MG/1
CAPSULE ORAL
Qty: 4 CAPSULE | Refills: 1 | Status: SHIPPED | OUTPATIENT
Start: 2023-12-15

## 2024-08-15 ENCOUNTER — HOSPITAL ENCOUNTER (OUTPATIENT)
Dept: MAMMOGRAPHY | Age: 64
Discharge: HOME | End: 2024-08-15
Payer: COMMERCIAL

## 2024-08-15 DIAGNOSIS — Z78.0 ASYMPTOMATIC POSTMENOPAUSAL STATUS: ICD-10-CM

## 2024-08-15 PROCEDURE — 77080 DXA BONE DENSITY AXIAL: CPT

## 2024-09-24 ENCOUNTER — HOSPITAL ENCOUNTER (OUTPATIENT)
Dept: MAMMOGRAPHY | Age: 64
Discharge: HOME | End: 2024-09-27
Payer: COMMERCIAL

## 2024-09-24 VITALS — HEIGHT: 63 IN | BODY MASS INDEX: 50.85 KG/M2 | WEIGHT: 287 LBS

## 2024-09-24 DIAGNOSIS — Z12.31 ENCOUNTER FOR SCREENING MAMMOGRAM FOR BREAST CANCER: ICD-10-CM

## 2024-09-24 PROCEDURE — 77063 BREAST TOMOSYNTHESIS BI: CPT

## 2025-05-12 ENCOUNTER — CLINICAL DOCUMENTATION (OUTPATIENT)
Dept: ORTHOPEDIC SURGERY | Age: 65
End: 2025-05-12

## (undated) DEVICE — SUTURE STRATAFIX SPRL SZ 1 L14IN ABSRB VLT L48CM CTX 1/2 SXPD2B405

## (undated) DEVICE — SOLUTION IV 250ML 0.9% SOD CHL CLR INJ FLX BG CONT PRT CLSR

## (undated) DEVICE — CORD RETRCT SIL

## (undated) DEVICE — MARKER RAD KNEE TIB CKPT STEREOTAXIC IMAG LESION LOC

## (undated) DEVICE — GUIDEPIN ORTHOPEDIC NAVIGATION 4X110 MM 2P SCREW STRL

## (undated) DEVICE — SUTURE VCRL SZ 2-0 L27IN ABSRB UD L36MM CP-1 1/2 CIR REV J266H

## (undated) DEVICE — INTEGRA® JARIT® CRILE FORCEPS, 5-1/2", STRAIGHT, MATTE FINISH: Brand: INTEGRA® JARIT®

## (undated) DEVICE — KENDALL RADIOLUCENT FOAM MONITORING ELECTRODE RECTANGULAR SHAPE: Brand: KENDALL

## (undated) DEVICE — MARKER RAD KNEE FEM CKPT STEREOTAXIC IMAG LESION LOC

## (undated) DEVICE — TRAY PREP DRY W/ PREM GLV 2 APPL 6 SPNG 2 UNDPD 1 OVERWRAP

## (undated) DEVICE — SUTURE VCRL SZ 1 L27IN ABSRB UD L36MM CP-1 1/2 CIR REV CUT J268H

## (undated) DEVICE — CONNECTOR TBNG OD5-7MM O2 END DISP

## (undated) DEVICE — SYRINGE MED 50ML LUERLOCK TIP

## (undated) DEVICE — TOTAL KNEE DR RIDGEWAY: Brand: MEDLINE INDUSTRIES, INC.

## (undated) DEVICE — Device: Brand: POWER-FLO®

## (undated) DEVICE — CLOSURE SKIN FACILITATES COMPATIBILITY W/ CERTAIN IS DSG

## (undated) DEVICE — BLOCK BITE AD 60FR W/ VELC STRP ADDRESSES MOST PT AND

## (undated) DEVICE — CLOSURE SKIN FLX NONINVASIVE PRELOC TECHNOLOGY FOR 24IN

## (undated) DEVICE — SOLUTION IRRIG 1000ML 0.9% SOD CHL USP POUR PLAS BTL

## (undated) DEVICE — NDL PRT INJ NSAF BLNT 18GX1.5 --

## (undated) DEVICE — CANNULA NSL ORAL AD FOR CAPNOFLEX CO2 O2 AIRLFE

## (undated) DEVICE — STERILE PRESSURE PROTECTOR PAD® FOR DE MAYO UNIVERSAL DISTRACTOR® (10/CASE): Brand: DE MAYO UNIVERSAL DISTRACTOR®

## (undated) DEVICE — SYR 3ML LL TIP 1/10ML GRAD --

## (undated) DEVICE — SYR 5ML 1/5 GRAD LL NSAF LF --

## (undated) DEVICE — BIPOLAR SEALER 23-112-1 AQM 6.0: Brand: AQUAMANTYS ®

## (undated) DEVICE — GUIDEPIN ORTHOPEDIC NAVIGATION 4X140 MM 2P SCREW STRL

## (undated) DEVICE — BANDAGE COBAN 4 IN COMPR W4INXL5YD FOAM COHESIVE QUIK STK SELF ADH SFT

## (undated) DEVICE — SOLUTION IRRIG 3000ML 0.9% SOD CHL USP UROMATIC PLAS CONT

## (undated) DEVICE — DRESSING HYDROFIBER AQUACEL AG ADVANTAGE 3.5X14 IN

## (undated) DEVICE — KIT DRP FOR RIO ROBOTIC ARM ASST SYS